# Patient Record
Sex: MALE | Race: WHITE | NOT HISPANIC OR LATINO | Employment: FULL TIME | URBAN - METROPOLITAN AREA
[De-identification: names, ages, dates, MRNs, and addresses within clinical notes are randomized per-mention and may not be internally consistent; named-entity substitution may affect disease eponyms.]

---

## 2017-02-23 ENCOUNTER — GENERIC CONVERSION - ENCOUNTER (OUTPATIENT)
Dept: OTHER | Facility: OTHER | Age: 37
End: 2017-02-23

## 2017-02-23 ENCOUNTER — HOSPITAL ENCOUNTER (EMERGENCY)
Facility: HOSPITAL | Age: 37
Discharge: HOME | End: 2017-02-23
Admitting: EMERGENCY MEDICINE
Payer: COMMERCIAL

## 2017-02-23 VITALS
OXYGEN SATURATION: 100 % | SYSTOLIC BLOOD PRESSURE: 126 MMHG | BODY MASS INDEX: 30.1 KG/M2 | TEMPERATURE: 98.1 F | HEART RATE: 63 BPM | WEIGHT: 215 LBS | HEIGHT: 71 IN | DIASTOLIC BLOOD PRESSURE: 81 MMHG | RESPIRATION RATE: 18 BRPM

## 2017-02-23 DIAGNOSIS — B34.9 VIRAL ILLNESS: Primary | ICD-10-CM

## 2017-02-23 LAB
ANION GAP BLD CALC-SCNC: 18 MMOL/L (ref 4–13)
BASOPHILS # BLD AUTO: 0.01 THOUSANDS/ΜL (ref 0–0.1)
BASOPHILS NFR BLD AUTO: 0 % (ref 0–1)
BUN BLD-MCNC: 14 MG/DL (ref 5–25)
CA-I BLD-SCNC: 1.19 MMOL/L (ref 1.12–1.32)
CHLORIDE BLD-SCNC: 97 MMOL/L (ref 100–108)
CREAT BLD-MCNC: 0.9 MG/DL (ref 0.6–1.3)
EOSINOPHIL # BLD AUTO: 0.02 THOUSAND/ΜL (ref 0–0.61)
EOSINOPHIL NFR BLD AUTO: 0 % (ref 0–6)
ERYTHROCYTE [DISTWIDTH] IN BLOOD BY AUTOMATED COUNT: 12.1 % (ref 11.6–15.1)
GFR SERPL CREATININE-BSD FRML MDRD: >60 ML/MIN/1.73SQ M
GLUCOSE SERPL-MCNC: 102 MG/DL (ref 65–140)
HCT VFR BLD AUTO: 42.4 % (ref 36.5–49.3)
HCT VFR BLD CALC: 42 % (ref 36.5–49.3)
HGB BLD-MCNC: 14.6 G/DL (ref 12–17)
HGB BLDA-MCNC: 14.3 G/DL (ref 12–17)
LYMPHOCYTES # BLD AUTO: 1.34 THOUSANDS/ΜL (ref 0.6–4.47)
LYMPHOCYTES NFR BLD AUTO: 22 % (ref 14–44)
MCH RBC QN AUTO: 30.9 PG (ref 26.8–34.3)
MCHC RBC AUTO-ENTMCNC: 34.4 G/DL (ref 31.4–37.4)
MCV RBC AUTO: 90 FL (ref 82–98)
MONOCYTES # BLD AUTO: 0.67 THOUSAND/ΜL (ref 0.17–1.22)
MONOCYTES NFR BLD AUTO: 11 % (ref 4–12)
NEUTROPHILS # BLD AUTO: 4.15 THOUSANDS/ΜL (ref 1.85–7.62)
NEUTS SEG NFR BLD AUTO: 67 % (ref 43–75)
PCO2 BLD: 27 MMOL/L (ref 21–32)
PLATELET # BLD AUTO: 160 THOUSANDS/UL (ref 149–390)
PMV BLD AUTO: 9.8 FL (ref 8.9–12.7)
POTASSIUM BLD-SCNC: 4.5 MMOL/L (ref 3.5–5.3)
RBC # BLD AUTO: 4.73 MILLION/UL (ref 3.88–5.62)
SODIUM BLD-SCNC: 137 MMOL/L (ref 136–145)
SPECIMEN SOURCE: ABNORMAL
WBC # BLD AUTO: 6.19 THOUSAND/UL (ref 4.31–10.16)

## 2017-02-23 PROCEDURE — 80047 BASIC METABLC PNL IONIZED CA: CPT

## 2017-02-23 PROCEDURE — 36415 COLL VENOUS BLD VENIPUNCTURE: CPT | Performed by: PHYSICIAN ASSISTANT

## 2017-02-23 PROCEDURE — 85025 COMPLETE CBC W/AUTO DIFF WBC: CPT | Performed by: PHYSICIAN ASSISTANT

## 2017-02-23 PROCEDURE — 99284 EMERGENCY DEPT VISIT MOD MDM: CPT

## 2017-02-23 PROCEDURE — 85014 HEMATOCRIT: CPT

## 2017-02-25 ENCOUNTER — APPOINTMENT (EMERGENCY)
Dept: RADIOLOGY | Facility: HOSPITAL | Age: 37
End: 2017-02-25
Payer: COMMERCIAL

## 2017-02-25 ENCOUNTER — HOSPITAL ENCOUNTER (EMERGENCY)
Facility: HOSPITAL | Age: 37
Discharge: HOME | End: 2017-02-25
Attending: EMERGENCY MEDICINE | Admitting: EMERGENCY MEDICINE
Payer: COMMERCIAL

## 2017-02-25 VITALS
BODY MASS INDEX: 29.29 KG/M2 | HEART RATE: 72 BPM | TEMPERATURE: 98.3 F | RESPIRATION RATE: 18 BRPM | WEIGHT: 210 LBS | OXYGEN SATURATION: 97 % | DIASTOLIC BLOOD PRESSURE: 80 MMHG | SYSTOLIC BLOOD PRESSURE: 133 MMHG

## 2017-02-25 DIAGNOSIS — R33.9 URINARY RETENTION: Primary | ICD-10-CM

## 2017-02-25 LAB
ANION GAP SERPL CALCULATED.3IONS-SCNC: 7 MMOL/L (ref 4–13)
BASOPHILS # BLD AUTO: 0.02 THOUSANDS/ΜL (ref 0–0.1)
BASOPHILS NFR BLD AUTO: 0 % (ref 0–1)
BILIRUB UR QL STRIP: NEGATIVE
BUN SERPL-MCNC: 16 MG/DL (ref 5–25)
CALCIUM SERPL-MCNC: 9 MG/DL (ref 8.3–10.1)
CHLORIDE SERPL-SCNC: 106 MMOL/L (ref 100–108)
CLARITY UR: CLEAR
CO2 SERPL-SCNC: 31 MMOL/L (ref 21–32)
COLOR UR: YELLOW
CREAT SERPL-MCNC: 0.87 MG/DL (ref 0.6–1.3)
EOSINOPHIL # BLD AUTO: 0.03 THOUSAND/ΜL (ref 0–0.61)
EOSINOPHIL NFR BLD AUTO: 1 % (ref 0–6)
ERYTHROCYTE [DISTWIDTH] IN BLOOD BY AUTOMATED COUNT: 11.8 % (ref 11.6–15.1)
GFR SERPL CREATININE-BSD FRML MDRD: >60 ML/MIN/1.73SQ M
GLUCOSE SERPL-MCNC: 74 MG/DL (ref 65–140)
GLUCOSE UR STRIP-MCNC: NEGATIVE MG/DL
HCT VFR BLD AUTO: 38.8 % (ref 36.5–49.3)
HGB BLD-MCNC: 13.3 G/DL (ref 12–17)
HGB UR QL STRIP.AUTO: NEGATIVE
KETONES UR STRIP-MCNC: NEGATIVE MG/DL
LEUKOCYTE ESTERASE UR QL STRIP: NEGATIVE
LYMPHOCYTES # BLD AUTO: 2.89 THOUSANDS/ΜL (ref 0.6–4.47)
LYMPHOCYTES NFR BLD AUTO: 63 % (ref 14–44)
MCH RBC QN AUTO: 30.9 PG (ref 26.8–34.3)
MCHC RBC AUTO-ENTMCNC: 34.3 G/DL (ref 31.4–37.4)
MCV RBC AUTO: 90 FL (ref 82–98)
MONOCYTES # BLD AUTO: 0.37 THOUSAND/ΜL (ref 0.17–1.22)
MONOCYTES NFR BLD AUTO: 8 % (ref 4–12)
NEUTROPHILS # BLD AUTO: 1.3 THOUSANDS/ΜL (ref 1.85–7.62)
NEUTS SEG NFR BLD AUTO: 28 % (ref 43–75)
NITRITE UR QL STRIP: NEGATIVE
NRBC BLD AUTO-RTO: 0 /100 WBCS
PH UR STRIP.AUTO: 6.5 [PH] (ref 4.5–8)
PLATELET # BLD AUTO: 172 THOUSANDS/UL (ref 149–390)
PMV BLD AUTO: 10.2 FL (ref 8.9–12.7)
POTASSIUM SERPL-SCNC: 3.8 MMOL/L (ref 3.5–5.3)
PROT UR STRIP-MCNC: NEGATIVE MG/DL
RBC # BLD AUTO: 4.3 MILLION/UL (ref 3.88–5.62)
SODIUM SERPL-SCNC: 144 MMOL/L (ref 136–145)
SP GR UR STRIP.AUTO: 1.02 (ref 1–1.03)
UROBILINOGEN UR QL STRIP.AUTO: 0.2 E.U./DL
WBC # BLD AUTO: 4.62 THOUSAND/UL (ref 4.31–10.16)

## 2017-02-25 PROCEDURE — 81002 URINALYSIS NONAUTO W/O SCOPE: CPT | Performed by: EMERGENCY MEDICINE

## 2017-02-25 PROCEDURE — 80048 BASIC METABOLIC PNL TOTAL CA: CPT | Performed by: EMERGENCY MEDICINE

## 2017-02-25 PROCEDURE — 99284 EMERGENCY DEPT VISIT MOD MDM: CPT

## 2017-02-25 PROCEDURE — 96374 THER/PROPH/DIAG INJ IV PUSH: CPT

## 2017-02-25 PROCEDURE — A9585 GADOBUTROL INJECTION: HCPCS | Performed by: EMERGENCY MEDICINE

## 2017-02-25 PROCEDURE — 96361 HYDRATE IV INFUSION ADD-ON: CPT

## 2017-02-25 PROCEDURE — 81003 URINALYSIS AUTO W/O SCOPE: CPT

## 2017-02-25 PROCEDURE — 72158 MRI LUMBAR SPINE W/O & W/DYE: CPT

## 2017-02-25 PROCEDURE — 85025 COMPLETE CBC W/AUTO DIFF WBC: CPT | Performed by: EMERGENCY MEDICINE

## 2017-02-25 PROCEDURE — 36415 COLL VENOUS BLD VENIPUNCTURE: CPT | Performed by: EMERGENCY MEDICINE

## 2017-02-25 PROCEDURE — 51798 US URINE CAPACITY MEASURE: CPT

## 2017-02-25 RX ADMIN — GADOBUTROL 9 ML: 604.72 INJECTION INTRAVENOUS at 22:43

## 2017-02-25 RX ADMIN — SODIUM CHLORIDE 1000 ML: 0.9 INJECTION, SOLUTION INTRAVENOUS at 20:00

## 2017-03-02 ENCOUNTER — ALLSCRIPTS OFFICE VISIT (OUTPATIENT)
Dept: OTHER | Facility: OTHER | Age: 37
End: 2017-03-02

## 2017-03-02 ENCOUNTER — TRANSCRIBE ORDERS (OUTPATIENT)
Dept: ADMINISTRATIVE | Facility: HOSPITAL | Age: 37
End: 2017-03-02

## 2017-03-02 DIAGNOSIS — R33.9 RETENTION OF URINE, UNSPECIFIED: Primary | ICD-10-CM

## 2017-05-14 ENCOUNTER — APPOINTMENT (EMERGENCY)
Dept: RADIOLOGY | Facility: HOSPITAL | Age: 37
End: 2017-05-14
Payer: COMMERCIAL

## 2017-05-14 ENCOUNTER — HOSPITAL ENCOUNTER (EMERGENCY)
Facility: HOSPITAL | Age: 37
Discharge: HOME | End: 2017-05-15
Attending: EMERGENCY MEDICINE | Admitting: EMERGENCY MEDICINE
Payer: COMMERCIAL

## 2017-05-14 VITALS
BODY MASS INDEX: 29.4 KG/M2 | DIASTOLIC BLOOD PRESSURE: 81 MMHG | SYSTOLIC BLOOD PRESSURE: 138 MMHG | HEART RATE: 65 BPM | OXYGEN SATURATION: 97 % | WEIGHT: 210 LBS | RESPIRATION RATE: 16 BRPM | HEIGHT: 71 IN | TEMPERATURE: 99 F

## 2017-05-14 DIAGNOSIS — I95.1 ORTHOSTATIC HYPOTENSION: ICD-10-CM

## 2017-05-14 DIAGNOSIS — R55 SYNCOPE: Primary | ICD-10-CM

## 2017-05-14 DIAGNOSIS — S01.81XA FACIAL LACERATION, INITIAL ENCOUNTER: ICD-10-CM

## 2017-05-14 DIAGNOSIS — S01.01XA SCALP LACERATION, INITIAL ENCOUNTER: ICD-10-CM

## 2017-05-14 LAB
ANION GAP BLD CALC-SCNC: 18 MMOL/L (ref 4–13)
ANION GAP SERPL CALCULATED.3IONS-SCNC: 6 MMOL/L (ref 4–13)
BASOPHILS # BLD AUTO: 0.02 THOUSANDS/ΜL (ref 0–0.1)
BASOPHILS NFR BLD AUTO: 0 % (ref 0–1)
BILIRUB UR QL STRIP: NEGATIVE
BUN BLD-MCNC: 27 MG/DL (ref 5–25)
BUN SERPL-MCNC: 23 MG/DL (ref 5–25)
CA-I BLD-SCNC: 1.22 MMOL/L (ref 1.12–1.32)
CALCIUM SERPL-MCNC: 9.2 MG/DL (ref 8.3–10.1)
CHLORIDE BLD-SCNC: 99 MMOL/L (ref 100–108)
CHLORIDE SERPL-SCNC: 104 MMOL/L (ref 100–108)
CLARITY UR: CLEAR
CLARITY, POC: YELLOW
CO2 SERPL-SCNC: 29 MMOL/L (ref 21–32)
COLOR UR: YELLOW
COLOR, POC: CLEAR
CREAT BLD-MCNC: 0.9 MG/DL (ref 0.6–1.3)
CREAT SERPL-MCNC: 0.96 MG/DL (ref 0.6–1.3)
EOSINOPHIL # BLD AUTO: 0.09 THOUSAND/ΜL (ref 0–0.61)
EOSINOPHIL NFR BLD AUTO: 1 % (ref 0–6)
ERYTHROCYTE [DISTWIDTH] IN BLOOD BY AUTOMATED COUNT: 12.6 % (ref 11.6–15.1)
GFR SERPL CREATININE-BSD FRML MDRD: >60 ML/MIN/1.73SQ M
GFR SERPL CREATININE-BSD FRML MDRD: >60 ML/MIN/1.73SQ M
GLUCOSE SERPL-MCNC: 134 MG/DL (ref 65–140)
GLUCOSE SERPL-MCNC: 135 MG/DL (ref 65–140)
GLUCOSE UR STRIP-MCNC: NEGATIVE MG/DL
HCT VFR BLD AUTO: 43.3 % (ref 36.5–49.3)
HCT VFR BLD CALC: 45 % (ref 36.5–49.3)
HGB BLD-MCNC: 15 G/DL (ref 12–17)
HGB BLDA-MCNC: 15.3 G/DL (ref 12–17)
HGB UR QL STRIP.AUTO: NEGATIVE
KETONES UR STRIP-MCNC: NEGATIVE MG/DL
LEUKOCYTE ESTERASE UR QL STRIP: NEGATIVE
LYMPHOCYTES # BLD AUTO: 3.29 THOUSANDS/ΜL (ref 0.6–4.47)
LYMPHOCYTES NFR BLD AUTO: 46 % (ref 14–44)
MCH RBC QN AUTO: 31.6 PG (ref 26.8–34.3)
MCHC RBC AUTO-ENTMCNC: 34.6 G/DL (ref 31.4–37.4)
MCV RBC AUTO: 91 FL (ref 82–98)
MONOCYTES # BLD AUTO: 0.39 THOUSAND/ΜL (ref 0.17–1.22)
MONOCYTES NFR BLD AUTO: 5 % (ref 4–12)
NEUTROPHILS # BLD AUTO: 3.36 THOUSANDS/ΜL (ref 1.85–7.62)
NEUTS SEG NFR BLD AUTO: 48 % (ref 43–75)
NITRITE UR QL STRIP: NEGATIVE
NRBC BLD AUTO-RTO: 0 /100 WBCS
PCO2 BLD: 29 MMOL/L (ref 21–32)
PH UR STRIP.AUTO: 6 [PH] (ref 4.5–8)
PLATELET # BLD AUTO: 181 THOUSANDS/UL (ref 149–390)
PMV BLD AUTO: 11 FL (ref 8.9–12.7)
POTASSIUM BLD-SCNC: 3.5 MMOL/L (ref 3.5–5.3)
POTASSIUM SERPL-SCNC: 3.7 MMOL/L (ref 3.5–5.3)
PROT UR STRIP-MCNC: NEGATIVE MG/DL
RBC # BLD AUTO: 4.75 MILLION/UL (ref 3.88–5.62)
SODIUM BLD-SCNC: 141 MMOL/L (ref 136–145)
SODIUM SERPL-SCNC: 139 MMOL/L (ref 136–145)
SP GR UR STRIP.AUTO: 1.02 (ref 1–1.03)
SPECIMEN SOURCE: ABNORMAL
UROBILINOGEN UR QL STRIP.AUTO: 0.2 E.U./DL
WBC # BLD AUTO: 7.16 THOUSAND/UL (ref 4.31–10.16)

## 2017-05-14 PROCEDURE — 96360 HYDRATION IV INFUSION INIT: CPT

## 2017-05-14 PROCEDURE — 85014 HEMATOCRIT: CPT

## 2017-05-14 PROCEDURE — 93005 ELECTROCARDIOGRAM TRACING: CPT | Performed by: EMERGENCY MEDICINE

## 2017-05-14 PROCEDURE — 96361 HYDRATE IV INFUSION ADD-ON: CPT

## 2017-05-14 PROCEDURE — 80047 BASIC METABLC PNL IONIZED CA: CPT

## 2017-05-14 PROCEDURE — 85025 COMPLETE CBC W/AUTO DIFF WBC: CPT | Performed by: EMERGENCY MEDICINE

## 2017-05-14 PROCEDURE — 80048 BASIC METABOLIC PNL TOTAL CA: CPT

## 2017-05-14 PROCEDURE — 81002 URINALYSIS NONAUTO W/O SCOPE: CPT | Performed by: EMERGENCY MEDICINE

## 2017-05-14 PROCEDURE — 36415 COLL VENOUS BLD VENIPUNCTURE: CPT

## 2017-05-14 PROCEDURE — 70450 CT HEAD/BRAIN W/O DYE: CPT

## 2017-05-14 PROCEDURE — 81003 URINALYSIS AUTO W/O SCOPE: CPT

## 2017-05-14 PROCEDURE — 72040 X-RAY EXAM NECK SPINE 2-3 VW: CPT

## 2017-05-14 RX ORDER — LIDOCAINE HYDROCHLORIDE AND EPINEPHRINE 10; 10 MG/ML; UG/ML
20 INJECTION, SOLUTION INFILTRATION; PERINEURAL ONCE
Status: COMPLETED | OUTPATIENT
Start: 2017-05-14 | End: 2017-05-14

## 2017-05-14 RX ADMIN — LIDOCAINE HYDROCHLORIDE,EPINEPHRINE BITARTRATE 20 ML: 10; .01 INJECTION, SOLUTION INFILTRATION; PERINEURAL at 22:29

## 2017-05-14 RX ADMIN — SODIUM CHLORIDE 1000 ML: 0.9 INJECTION, SOLUTION INTRAVENOUS at 22:31

## 2017-05-15 LAB
ATRIAL RATE: 68 BPM
P AXIS: 56 DEGREES
PR INTERVAL: 148 MS
QRS AXIS: 48 DEGREES
QRSD INTERVAL: 92 MS
QT INTERVAL: 372 MS
QTC INTERVAL: 395 MS
T WAVE AXIS: 70 DEGREES
VENTRICULAR RATE: 68 BPM

## 2017-05-15 PROCEDURE — 99285 EMERGENCY DEPT VISIT HI MDM: CPT

## 2017-05-15 RX ORDER — IBUPROFEN 600 MG/1
600 TABLET ORAL EVERY 6 HOURS PRN
Qty: 40 TABLET | Refills: 0 | Status: SHIPPED | OUTPATIENT
Start: 2017-05-15 | End: 2018-04-13 | Stop reason: ALTCHOICE

## 2017-05-15 RX ORDER — METHOCARBAMOL 500 MG/1
500 TABLET, FILM COATED ORAL 3 TIMES DAILY
Qty: 15 TABLET | Refills: 0 | Status: SHIPPED | OUTPATIENT
Start: 2017-05-15 | End: 2018-04-13 | Stop reason: ALTCHOICE

## 2017-05-15 RX ORDER — METHOCARBAMOL 500 MG/1
500 TABLET, FILM COATED ORAL 3 TIMES DAILY
Qty: 15 TABLET | Refills: 0 | Status: SHIPPED | OUTPATIENT
Start: 2017-05-15 | End: 2017-05-15

## 2017-05-15 RX ORDER — IBUPROFEN 600 MG/1
600 TABLET ORAL EVERY 6 HOURS PRN
Qty: 40 TABLET | Refills: 0 | Status: SHIPPED | OUTPATIENT
Start: 2017-05-15 | End: 2017-05-15

## 2017-11-03 ENCOUNTER — GENERIC CONVERSION - ENCOUNTER (OUTPATIENT)
Dept: OTHER | Facility: OTHER | Age: 37
End: 2017-11-03

## 2018-01-13 NOTE — PROGRESS NOTES
Patient called at 5:55PM on 2/23 to neurosurgery answering service  CC of bladder distension, worsening over past 2 weeks  Patient visited [facility] ED today with flu like symptoms x 5 days  Symptoms started  on Sunday with headache, myalgias, productive cough, green phlegm production, and intermittent fevers (Tmax = 101F)  Patient states that he self diagnosed himself with the flu as his wife and children had similar complaints over the past week  He has  been taking 400 mg ibuprofen BID for myalgias and headaches  He said that the ibuprofen has been controlling his pain and fevers  He notes that he has been drinking at least 1/2 gallon of water daily  His appetite and activity level have been decreased  The patient states that all of his symptoms have improved drastically  He presented today because of concerns of being dehydrated  He was driving to work this morning when he felt a little "off " He had two episodes of diarrhea last night and has not  had a BM since then (normally, he has two formed BMs daily)  He was urinating frequently at that time but felt that his mouth is dry and he has a thirsty feeling in his mouth  He denies any ear pain, eye pain/redness, nasal congestion, nausea, abdominal  pain, vomiting, SOB, chest palpitations, or rashes  Patient's renal fxn was WNL  He was d/c home and told to hydrate appropriately  Since home he has been drinking large quantities of water  Feels as though he has decreased sensation to urinate, becomes  distended (though not uncomfortable) but is able to urinate when he relaxes  Feels he empties his bladder 25%, though going frequently  Since laying flat over the last 5 days he notes this is progressive, though worse today  Hx of L5-S1 "urgent discectomy"  in 2014 with Dr Dean Lucas  Had a similar sensation of voiding difficulty at that time, though then was accompanied with radiculopathy and back pain   Today without complaint of back pain, radiculopathy, saddle anesthesia, urinary incontinence, flank pain  Hx of Vasectomy in 2015, last seen by urology 2 years ago  With dx PVR of 100cc, no intervention  Also with congenital "UPJ", no issues since  Discussed with patient that feelings of retention not likely an urgent neurosurgical matter if he continues  to void 25% frequently, no extreme discomfort of distension, no back pain, radicular pain, LE weakness, or saddle anesthesia  Recommended calling back if any of those sx occur  Also, recommended calling urology on call service for their input  Not taking  any new medications, only Ibuprofen 400-800mg daily, unlikely to be etiology         Electronically signed Anitra Abel   Feb 23 2017  6:20PM EST Author

## 2018-01-14 VITALS
SYSTOLIC BLOOD PRESSURE: 104 MMHG | BODY MASS INDEX: 30.57 KG/M2 | HEIGHT: 71 IN | DIASTOLIC BLOOD PRESSURE: 72 MMHG | HEART RATE: 64 BPM | WEIGHT: 218.38 LBS

## 2018-02-02 ENCOUNTER — OFFICE VISIT (OUTPATIENT)
Dept: NEUROLOGY | Facility: CLINIC | Age: 38
End: 2018-02-02
Payer: COMMERCIAL

## 2018-02-02 VITALS
WEIGHT: 215 LBS | BODY MASS INDEX: 29.99 KG/M2 | SYSTOLIC BLOOD PRESSURE: 144 MMHG | HEART RATE: 70 BPM | DIASTOLIC BLOOD PRESSURE: 68 MMHG

## 2018-02-02 DIAGNOSIS — R79.89 LOW VITAMIN D LEVEL: ICD-10-CM

## 2018-02-02 DIAGNOSIS — M54.2 CERVICALGIA: ICD-10-CM

## 2018-02-02 DIAGNOSIS — S06.0X0D CONCUSSION WITHOUT LOSS OF CONSCIOUSNESS, SUBSEQUENT ENCOUNTER: ICD-10-CM

## 2018-02-02 DIAGNOSIS — G43.109 MIGRAINE WITH AURA AND WITHOUT STATUS MIGRAINOSUS, NOT INTRACTABLE: Primary | ICD-10-CM

## 2018-02-02 PROBLEM — S06.0X9A CONCUSSION: Status: ACTIVE | Noted: 2018-02-02

## 2018-02-02 PROBLEM — S06.0XAA CONCUSSION: Status: ACTIVE | Noted: 2018-02-02

## 2018-02-02 PROCEDURE — 99244 OFF/OP CNSLTJ NEW/EST MOD 40: CPT | Performed by: PSYCHIATRY & NEUROLOGY

## 2018-02-02 NOTE — PATIENT INSTRUCTIONS
Preventive:   1  Magnesium Oxide 400-500 mg a day  If any diarrhea or upset stomach, decrease dose as tolerated  2  B2 400 mg a day  May take once a day or take 200 mg twice a day  We did discuss that this supplement will change the color of the urine to fluorescent yellow no matter how hydrated  Need to keep a headache diary to understand how many headaches he is really getting  Abortive:   - Advil for now and less then 3 times a week to prevent from developing rebound headaches  The patient was counseled regarding:  Discussed side effects of all medications prescribed today to the patient in detail  Patient education was completed today and we also discussed precautions for rebound headaches  When patient has a moderate to severe headache, they should seek rest, initiate relaxation and apply cold compresses to the head  1  Maintain regular sleep schedule  Adults need at least 7-8 hours of uninterrupted a night  2  Limit over the counter medications such as Tylenol, Ibuprofen, Aleve, Excedrin  (No more than 3 times a week)  3  Maintain headache diary  We discussed an MARIANNE for a smart phone is "Migraine eDiary"  4  Limit caffeine to 1-2 cups a day or less  5  Avoid dietary trigger  (list given to the patient)  6  Patient is to have regular frequent meals to prevent headache onset  7   Please drink at least 64 ounces of water a day to help remain hydrated

## 2018-02-15 ENCOUNTER — EVALUATION (OUTPATIENT)
Dept: PHYSICAL THERAPY | Facility: REHABILITATION | Age: 38
End: 2018-02-15
Payer: COMMERCIAL

## 2018-02-15 DIAGNOSIS — M54.2 CERVICALGIA: Primary | ICD-10-CM

## 2018-02-15 PROCEDURE — G8984 CARRY CURRENT STATUS: HCPCS | Performed by: PHYSICAL THERAPIST

## 2018-02-15 PROCEDURE — 97161 PT EVAL LOW COMPLEX 20 MIN: CPT | Performed by: PHYSICAL THERAPIST

## 2018-02-15 PROCEDURE — G8985 CARRY GOAL STATUS: HCPCS | Performed by: PHYSICAL THERAPIST

## 2018-02-15 PROCEDURE — 97140 MANUAL THERAPY 1/> REGIONS: CPT | Performed by: PHYSICAL THERAPIST

## 2018-02-15 NOTE — PROGRESS NOTES
PT Evaluation     Today's date: 2/15/2018  Patient name: Gloria Nguyễn  : 1980  MRN: [id number]  Referring provider: Carmen Cohen MD  Dx:   Encounter Diagnosis   Name Primary?  Cervicalgia Yes                  Assessment    Assessment details: Gloria Nguyễn is a 40 y o  male referred to outpt PT with dx of  Pt presents with left cervical rotation, positive STT into UT/LS/MT, TTP in left cervical musculature, and positive PAVIM restriction  Pt funct is limited with decrease in tolerance to turning head while driving or sleeping in prone, but notes ability to perform most funct tasks despite sx's  Pt would benefit from skilled PT to address these limitations and max funct  Goals  Impairment 1  Increase ROM by 25% x4 weeks  2  PAVIM wnl's x4 weeks  Funct 1  Return to driving or turning head to left without pain x4 weeks  Plan  Patient would benefit from: skilled PT  Planned therapy interventions: manual therapy, neuromuscular re-education, postural training and therapeutic exercise  Frequency: 1x week  Duration in weeks: 6        Subjective Evaluation    History of Present Illness  Mechanism of injury: Pt notes May 2017 he was in the shower at home and passed out, falling out of the shower and landed on right side of head  Pt notes initially was seen for stitches, but did not f/u immediately after incident  Pt notes after months of c/o neck pain and HA's at times  Pt is scheduled to MRI of brain tomorrow per neurologist visit on 18  Pt notes HA's cause "aura" but denies any changes in vision and is able to be controlled with use of medication  Pt cont to work without restrictions but notes having cervical stiffness  Pt notes overall he is able to perform all activities and funct needed throughout the day, however does c/o stiffness and soreness primarily to left  Pt denies any sleep disturbances      Pain  Current pain ratin  At best pain ratin  At worst pain rating: 7  Relieving factors: change in position and medications    Patient Goals  Patient goals for therapy: increased motion          Objective     Active Range of Motion   Cervical/Thoracic Spine   Cervical    Flexion: WFL  Extension: WFL  Left lateral flexion: WFL and with pain  Right lateral flexion: WFL  Left rotation: 45 degrees with pain  Right rotation: [facility]    Additional Active Range of Motion Details  Pt has normal cervical ROM in all directions except left rotation  Pt has pain with left rotation and left SB at TP of C6-7  Pt has mod FHRS posture bilat  Pt has diminished lower cervical PAVIM 2/3 and upper thoracic 1/3 without pain, however stiff  Pt has mod STT and tenderness present into left LS UT MT  Strength/Myotome Testing   Cervical Spine     Left   Normal strength    Right etr  Normal strength      Precautions: Lumbar surgery       Specialty Daily Treatment Diary     Manual  2/15/18       IASTM Left UT/LS 5 mins       CPA/UPA C6-T4 5 mins                                   Exercise Diary         UT stretch        Chin tucks        LS stretch        Rhomboid stretch        UBE bwds                                                                                                                                    Modalities                                        Flowsheet Rows    Flowsheet Row Most Recent Value   PT/OT G-Codes   Current Score  78   Projected Score  80   FOTO information reviewed  Yes   Assessment Type  Evaluation   G code set  Carrying, Moving & Handling Objects   Carrying, Moving and Handling Objects Current Status ()  CJ   Carrying, Moving and Handling Objects Goal Status ()  Jair Delacruz

## 2018-02-16 ENCOUNTER — HOSPITAL ENCOUNTER (OUTPATIENT)
Dept: MRI IMAGING | Facility: HOSPITAL | Age: 38
Discharge: HOME | End: 2018-02-16
Attending: PSYCHIATRY & NEUROLOGY
Payer: COMMERCIAL

## 2018-02-16 ENCOUNTER — TRANSCRIBE ORDERS (OUTPATIENT)
Dept: ADMINISTRATIVE | Facility: HOSPITAL | Age: 38
End: 2018-02-16

## 2018-02-16 DIAGNOSIS — G43.109 MIGRAINE WITH AURA AND WITHOUT STATUS MIGRAINOSUS, NOT INTRACTABLE: ICD-10-CM

## 2018-02-16 PROCEDURE — 70553 MRI BRAIN STEM W/O & W/DYE: CPT

## 2018-02-16 PROCEDURE — A9585 GADOBUTROL INJECTION: HCPCS | Performed by: PSYCHIATRY & NEUROLOGY

## 2018-02-16 RX ADMIN — GADOBUTROL 9 ML: 604.72 INJECTION INTRAVENOUS at 16:50

## 2018-02-22 ENCOUNTER — OFFICE VISIT (OUTPATIENT)
Dept: PHYSICAL THERAPY | Facility: REHABILITATION | Age: 38
End: 2018-02-22
Payer: COMMERCIAL

## 2018-02-22 DIAGNOSIS — M54.2 CERVICALGIA: Primary | ICD-10-CM

## 2018-02-22 PROCEDURE — 97140 MANUAL THERAPY 1/> REGIONS: CPT

## 2018-02-22 PROCEDURE — 97110 THERAPEUTIC EXERCISES: CPT

## 2018-02-22 NOTE — PROGRESS NOTES
Daily Note     Today's date: 2018  Patient name: Lennox Kurtz  : 1980  MRN: [id number]  Referring provider: Laine Burgos MD  Dx:   Encounter Diagnosis     ICD-10-CM    1  Cervicalgia M54 2                   Subjective: Pt reports "some improvement" in sx's after IE   Pt notes tightness with cervical rotation L > R as primary limitation, denies pain currently  Objective: See treatment diary below  Precautions: Lumbar surgery       Specialty Daily Treatment Diary      Manual  2/15/18  2/22/18         IASTM Left UT/LS 5 mins  10 mins         CPA/UPA C6-T4 5 mins  NP                                                         Exercise Diary              UT stretch    15"x5         Chin tucks    5"x10         LS stretch    15"x5         Rhomboid stretch    15"x5         UBE bwds    5 mins                                                                                                                                                                                                                                 Modalities                                                                 Assessment: Tolerated treatment well  Has mod restrictions present into L Ut/levator, f/b addition of stretches with good response  Pt requires min cues to facilitate proper technique and denies any increase in pain  Patient was provided with HEP, verbalizes understanding and would benefit from continued PT  Plan: Continue per plan of care

## 2018-04-13 ENCOUNTER — HOSPITAL ENCOUNTER (EMERGENCY)
Facility: HOSPITAL | Age: 38
Discharge: HOME | End: 2018-04-14
Attending: EMERGENCY MEDICINE
Payer: COMMERCIAL

## 2018-04-13 DIAGNOSIS — K52.9 ACUTE GASTROENTERITIS: Primary | ICD-10-CM

## 2018-04-13 RX ORDER — IBUPROFEN 600 MG/1
TABLET ORAL EVERY 6 HOURS PRN
COMMUNITY

## 2018-04-13 RX ORDER — ONDANSETRON 2 MG/ML
4 INJECTION INTRAMUSCULAR; INTRAVENOUS ONCE
Status: DISCONTINUED | OUTPATIENT
Start: 2018-04-14 | End: 2018-04-14 | Stop reason: HOSPADM

## 2018-04-13 RX ORDER — KETOROLAC TROMETHAMINE 30 MG/ML
15 INJECTION, SOLUTION INTRAMUSCULAR; INTRAVENOUS ONCE
Status: DISCONTINUED | OUTPATIENT
Start: 2018-04-14 | End: 2018-04-14 | Stop reason: HOSPADM

## 2018-04-14 VITALS
SYSTOLIC BLOOD PRESSURE: 132 MMHG | RESPIRATION RATE: 18 BRPM | HEART RATE: 89 BPM | DIASTOLIC BLOOD PRESSURE: 75 MMHG | OXYGEN SATURATION: 96 % | WEIGHT: 210 LBS | TEMPERATURE: 98 F | BODY MASS INDEX: 29.29 KG/M2

## 2018-04-14 PROCEDURE — 96360 HYDRATION IV INFUSION INIT: CPT

## 2018-04-14 PROCEDURE — 99283 EMERGENCY DEPT VISIT LOW MDM: CPT

## 2018-04-14 RX ORDER — ONDANSETRON 4 MG/1
4 TABLET, ORALLY DISINTEGRATING ORAL EVERY 8 HOURS PRN
Qty: 10 TABLET | Refills: 0 | Status: SHIPPED | OUTPATIENT
Start: 2018-04-14 | End: 2018-05-04

## 2018-04-14 RX ORDER — DICYCLOMINE HCL 20 MG
20 TABLET ORAL EVERY 6 HOURS PRN
Qty: 10 TABLET | Refills: 0 | Status: SHIPPED | OUTPATIENT
Start: 2018-04-14 | End: 2018-05-04

## 2018-04-14 RX ADMIN — SODIUM CHLORIDE 1000 ML: 0.9 INJECTION, SOLUTION INTRAVENOUS at 00:07

## 2018-04-14 RX ADMIN — SODIUM CHLORIDE 1000 ML: 0.9 INJECTION, SOLUTION INTRAVENOUS at 00:47

## 2018-04-14 NOTE — DISCHARGE INSTRUCTIONS
Gastroenteritis   WHAT YOU NEED TO KNOW:   Gastroenteritis, or stomach flu, is an infection of the stomach and intestines  DISCHARGE INSTRUCTIONS:   Call 911 for any of the following:   · You have trouble breathing or a very fast pulse  Return to the emergency department if:   · You see blood in your diarrhea  · You cannot stop vomiting  · You have not urinated for 12 hours  · You feel like you are going to faint  Contact your healthcare provider if:   · You have a fever  · You continue to vomit or have diarrhea, even after treatment  · You see worms in your diarrhea  · Your mouth or eyes are dry  You are not urinating as much or as often  · You have questions or concerns about your condition or care  Medicines:   · Medicines  may be given to stop vomiting or diarrhea, decrease abdominal cramps, or treat an infection  · Take your medicine as directed  Contact your healthcare provider if you think your medicine is not helping or if you have side effects  Tell him or her if you are allergic to any medicine  Keep a list of the medicines, vitamins, and herbs you take  Include the amounts, and when and why you take them  Bring the list or the pill bottles to follow-up visits  Carry your medicine list with you in case of an emergency  Manage your symptoms:   · Drink liquids as directed  Ask your healthcare provider how much liquid to drink each day, and which liquids are best for you  You may also need to drink an oral rehydration solution (ORS)  An ORS has the right amounts of sugar, salt, and minerals in water to replace body fluids  · Eat bland foods  When you feel hungry, begin eating soft, bland foods  Examples are bananas, clear soup, potatoes, and applesauce  Do not have dairy products, alcohol, sugary drinks, or drinks with caffeine until you feel better  · Rest as much as possible  Slowly start to do more each day when you begin to feel better    Prevent the spread of gastroenteritis:  Gastroenteritis can spread easily  Keep yourself, your family, and your surroundings clean to help prevent the spread of gastroenteritis:  · Wash your hands often  Use soap and water  Wash your hands after you use the bathroom, change a child's diapers, or sneeze  Wash your hands before you prepare or eat food  · Clean surfaces and do laundry often  Wash your clothes and towels separately from the rest of the laundry  Clean surfaces in your home with antibacterial  or bleach  · Clean food thoroughly and cook safely  Wash raw vegetables before you cook  Cook meat, fish, and eggs fully  Do not use the same dishes for raw meat as you do for other foods  Refrigerate any leftover food immediately  · Be aware when you camp or travel  Drink only clean water  Do not drink from rivers or lakes unless you purify or boil the water first  When you travel, drink bottled water and do not add ice  Do not eat fruit that has not been peeled  Do not eat raw fish or meat that is not fully cooked  Follow up with your healthcare provider as directed:  Write down your questions so you remember to ask them during your visits  © 2017 [employer] Information is for End User's use only and may not be sold, redistributed or otherwise used for commercial purposes  All illustrations and images included in CareNotes® are the copyrighted property of [employer]  or Jacoby Bowman  The above information is an  only  It is not intended as medical advice for individual conditions or treatments  Talk to your doctor, nurse or pharmacist before following any medical regimen to see if it is safe and effective for you  Abdominal Pain   WHAT YOU NEED TO KNOW:   Abdominal pain can be dull, achy, or sharp  You may have pain in one area of your abdomen, or in your entire abdomen   Your pain may be caused by a condition such as constipation, food sensitivity or poisoning, infection, or a blockage  Abdominal pain can also be from a hernia, appendicitis, or an ulcer  Liver, gallbladder, or kidney conditions can also cause abdominal pain  The cause of your abdominal pain may be unknown  DISCHARGE INSTRUCTIONS:   Return to the emergency department if:   · You have new chest pain or shortness of breath  · You have pulsing pain in your upper abdomen or lower back that suddenly becomes constant  · Your pain is in the right lower abdominal area and worsens with movement  · You have a fever over 100 4°F (38°C) or shaking chills  · You are vomiting and cannot keep food or liquids down  · Your pain does not improve or gets worse over the next 8 to 12 hours  · You see blood in your vomit or bowel movements, or they look black and tarry  · Your skin or the whites of your eyes turn yellow  · You are a woman and have a large amount of vaginal bleeding that is not your monthly period  Contact your healthcare provider if:   · You have pain in your lower back  · You are a man and have pain in your testicles  · You have pain when you urinate  · You have questions or concerns about your condition or care  Follow up with your healthcare provider within 24 hours or as directed:  Write down your questions so you remember to ask them during your visits  Medicines:   · Medicines  may be given to calm your stomach and prevent vomiting or to decrease pain  Ask how to take pain medicine safely  · Take your medicine as directed  Contact your healthcare provider if you think your medicine is not helping or if you have side effects  Tell him of her if you are allergic to any medicine  Keep a list of the medicines, vitamins, and herbs you take  Include the amounts, and when and why you take them  Bring the list or the pill bottles to follow-up visits  Carry your medicine list with you in case of an emergency    © 2017 [employer]  Information is for End User's use only and may not be sold, redistributed or otherwise used for commercial purposes  All illustrations and images included in CareNotes® are the copyrighted property of [employer]  or Jacoby Bowman  The above information is an  only  It is not intended as medical advice for individual conditions or treatments  Talk to your doctor, nurse or pharmacist before following any medical regimen to see if it is safe and effective for you

## 2018-04-14 NOTE — ED NOTES
Pt states he is feeling pretty well, has had no vomiting or diarrhea since arrival to ER  Pt satets his mouth feels very dry, pt given ice chips for PO challenge         Jese Choudhury RN  04/14/18 7670

## 2018-04-14 NOTE — ED PROVIDER NOTES
History  Chief Complaint   Patient presents with    Vomiting     Pt c/o n/v/d that began 7 hours PTA; Pt also c/o bloating feeling; denies urinary symptoms    Diarrhea     68-year-old male with a history of migraine headaches, chronic back pain, UPJ obstruction, presents to the emergency department with nausea, vomiting and diarrhea  Patient states he came home from work this afternoon and had a headache  He went to lay down to take a nap and woke a short while later with abdominal bloating and feeling chilled  He then had 4 episodes of diarrhea  This diarrhea was nonbloody  He slept for another 2 hours and woke up and tried to drink some water and eat a banana which he vomited  He then had 1 more episode of diarrhea  He states he was exposed to a co-worker who had similar symptoms earlier in the week  He has had no fevers  History provided by:  Patient   used: No    Diarrhea   Quality:  Watery  Severity:  Unable to specify  Onset quality:  Gradual  Number of episodes:  4  Timing:  Intermittent  Progression:  Unchanged  Relieved by:  Nothing  Worsened by:  Nothing  Ineffective treatments:  Liquids  Associated symptoms: abdominal pain, chills, headaches and vomiting    Associated symptoms: no arthralgias, no recent cough, no diaphoresis, no fever, no myalgias and no URI    Abdominal pain:     Location:  Generalized    Quality: bloating      Severity:  Unable to specify    Onset quality:  Gradual    Duration:  8 hours    Timing:  Intermittent    Progression:  Unchanged    Chronicity:  New  Risk factors: sick contacts    Risk factors: no recent antibiotic use, no suspicious food intake and no travel to endemic areas        Prior to Admission Medications   Prescriptions Last Dose Informant Patient Reported?  Taking?   ibuprofen (MOTRIN) 600 mg tablet 4/13/2018 at 1630  Yes Yes   Sig: Take by mouth every 6 (six) hours as needed for mild pain      Facility-Administered Medications: None       Past Medical History:   Diagnosis Date    Migraine     UPJ (ureteropelvic junction) obstruction     UPJ (ureteropelvic junction) obstruction     Urinary retention        Past Surgical History:   Procedure Laterality Date    BACK SURGERY      URETERAL EXPLORATION         History reviewed  No pertinent family history  I have reviewed and agree with the history as documented  Social History   Substance Use Topics    Smoking status: Never Smoker    Smokeless tobacco: Never Used    Alcohol use Yes      Comment: social        Review of Systems   Constitutional: Positive for chills  Negative for diaphoresis and fever  HENT: Negative  Eyes: Negative  Respiratory: Negative  Cardiovascular: Negative  Gastrointestinal: Positive for abdominal pain, diarrhea, nausea and vomiting  Negative for abdominal distention, blood in stool and constipation  Genitourinary: Negative  Musculoskeletal: Negative for arthralgias, myalgias and neck pain  Skin: Negative  Allergic/Immunologic: Negative  Neurological: Positive for headaches  Negative for weakness and numbness  Hematological: Negative  Psychiatric/Behavioral: Negative  All other systems reviewed and are negative  Physical Exam  ED Triage Vitals   Temperature Pulse Respirations Blood Pressure SpO2   04/13/18 2340 04/13/18 2340 04/13/18 2340 04/13/18 2340 04/13/18 2340   98 °F (36 7 °C) 88 16 120/76 96 %      Temp Source Heart Rate Source Patient Position - Orthostatic VS BP Location FiO2 (%)   04/13/18 2340 04/13/18 2340 04/14/18 0146 04/14/18 0146 --   Oral Monitor Sitting Left arm       Pain Score       04/13/18 2340       3           Orthostatic Vital Signs  Vitals:    04/13/18 2340 04/14/18 0146   BP: 120/76 132/75   Pulse: 88 89   Patient Position - Orthostatic VS:  Sitting       Physical Exam   Constitutional: He is oriented to person, place, and time  He appears well-developed and well-nourished    Non-toxic appearance  He does not have a sickly appearance  He does not appear ill  No distress  HENT:   Head: Normocephalic and atraumatic  Right Ear: External ear normal    Left Ear: External ear normal    Mouth/Throat: Oropharynx is clear and moist    Eyes: Conjunctivae are normal  Pupils are equal, round, and reactive to light  No scleral icterus  Cardiovascular: Normal rate, regular rhythm and normal heart sounds  Pulmonary/Chest: Effort normal and breath sounds normal    Abdominal: Soft  Bowel sounds are normal  He exhibits no distension and no mass  There is no tenderness  No hernia  Neurological: He is alert and oriented to person, place, and time  He has normal strength and normal reflexes  He exhibits normal muscle tone  Skin: Skin is warm and dry  No rash noted  He is not diaphoretic  No erythema  No pallor  Psychiatric: He has a normal mood and affect  Nursing note and vitals reviewed  ED Medications  Medications   ondansetron (ZOFRAN) injection 4 mg (4 mg Intravenous Not Given 4/14/18 0000)   ketorolac (TORADOL) injection 15 mg (15 mg Intravenous Not Given 4/14/18 0000)   sodium chloride 0 9 % bolus 1,000 mL (0 mL Intravenous Stopped 4/14/18 0047)   sodium chloride 0 9 % bolus 1,000 mL (0 mL Intravenous Stopped 4/14/18 0128)       Diagnostic Studies  Results Reviewed     None                 No orders to display              Procedures  Procedures       Phone Contacts  ED Phone Contact    ED Course  ED Course as of Apr 14 0154   Sat Apr 14, 2018   0150 Patient feeling better  Stable for discharge                                MDM  Number of Diagnoses or Management Options  Diagnosis management comments: 43-year-old male presents with abdominal bloating, diarrhea and 1 episode of vomiting that occurred after he got home from work today  He states a co-worker was sick with similar symptoms earlier in the week    He states he was taking sips of liquids but had an episode of vomiting and was afraid he was going to become dehydrated  On exam he is awake and alert and in no distress  He does not have any tenderness on abdominal exam   Given he had a a co-worker with similar symptoms is most likely viral gastroenteritis  Will give IV fluids, Zofran and Toradol and reassess  CritCare Time    Disposition  Final diagnoses:   Acute gastroenteritis     Time reflects when diagnosis was documented in both MDM as applicable and the Disposition within this note     Time User Action Codes Description Comment    4/14/2018  1:53 AM Yumiko COLON Add [K52 9] Acute gastroenteritis       ED Disposition     ED Disposition Condition Comment    Discharge  Georgia Hernandez discharge to home/self care  Condition at discharge: Good        Follow-up Information     Follow up With Specialties Details Why Jackson Gimenez MD Family Medicine Schedule an appointment as soon as possible for a visit in 2 days If symptoms worsen or return to the emergency department with worsening symptoms [address] [place], [place] [address] [id number]  [phone]          Patient's Medications   Discharge Prescriptions    DICYCLOMINE (BENTYL) 20 MG TABLET    Take 1 tablet (20 mg total) by mouth every 6 (six) hours as needed (pain)       Start Date: 4/14/2018 End Date: --       Order Dose: 20 mg       Quantity: 10 tablet    Refills: 0    ONDANSETRON (ZOFRAN-ODT) 4 MG DISINTEGRATING TABLET    Take 1 tablet (4 mg total) by mouth every 8 (eight) hours as needed for nausea or vomiting       Start Date: 4/14/2018 End Date: --       Order Dose: 4 mg       Quantity: 10 tablet    Refills: 0     No discharge procedures on file      ED Provider  Electronically Signed by           Jose Domínguez DO  04/14/18 5472

## 2018-04-14 NOTE — ED NOTES
Pt states he has had 4 episodes of diarrhea since 1700 tonight, and one episode of vomiting after he ate a banana a short time ago  Pt reports current nausea but denies abd pain  Pt reports feeling "slightly bloated" at present    Pt states he has been drinking water and Pedialyte and has not vomited but states "I get dehydrated very easily and I won't be able to drink enough quickly enough to catch up for what I've already lost in hydration today "        Juan Pablo Corcoran, FRANCISCO J  04/13/18 [id number]

## 2018-04-14 NOTE — ED NOTES
Pt states he does not have any nausea currently and states he has no pain or bloating, so he does not want medications at this time, and states instead that he just wants fluids right now to see if that makes him feel better         Yordan Nieves RN  04/14/18 3806

## 2018-05-03 ENCOUNTER — TRANSCRIBE ORDERS (OUTPATIENT)
Dept: LAB | Facility: CLINIC | Age: 38
End: 2018-05-03

## 2018-05-03 ENCOUNTER — APPOINTMENT (OUTPATIENT)
Dept: LAB | Facility: CLINIC | Age: 38
End: 2018-05-03
Payer: COMMERCIAL

## 2018-05-03 DIAGNOSIS — G43.109 MIGRAINE WITH AURA AND WITHOUT STATUS MIGRAINOSUS, NOT INTRACTABLE: ICD-10-CM

## 2018-05-03 DIAGNOSIS — R79.89 LOW VITAMIN D LEVEL: ICD-10-CM

## 2018-05-03 LAB
25(OH)D3 SERPL-MCNC: 17.3 NG/ML (ref 30–100)
TSH SERPL DL<=0.05 MIU/L-ACNC: 1.29 UIU/ML (ref 0.36–3.74)
VIT B12 SERPL-MCNC: 729 PG/ML (ref 100–900)

## 2018-05-03 PROCEDURE — 82306 VITAMIN D 25 HYDROXY: CPT

## 2018-05-03 PROCEDURE — 36415 COLL VENOUS BLD VENIPUNCTURE: CPT

## 2018-05-03 PROCEDURE — 84443 ASSAY THYROID STIM HORMONE: CPT

## 2018-05-03 PROCEDURE — 82607 VITAMIN B-12: CPT

## 2018-05-04 ENCOUNTER — OFFICE VISIT (OUTPATIENT)
Dept: NEUROLOGY | Facility: CLINIC | Age: 38
End: 2018-05-04
Payer: COMMERCIAL

## 2018-05-04 VITALS
SYSTOLIC BLOOD PRESSURE: 129 MMHG | HEIGHT: 71 IN | WEIGHT: 218.8 LBS | DIASTOLIC BLOOD PRESSURE: 73 MMHG | HEART RATE: 61 BPM | BODY MASS INDEX: 30.63 KG/M2

## 2018-05-04 DIAGNOSIS — M54.2 CERVICALGIA: ICD-10-CM

## 2018-05-04 DIAGNOSIS — R79.89 LOW VITAMIN D LEVEL: ICD-10-CM

## 2018-05-04 DIAGNOSIS — G43.109 MIGRAINE WITH AURA AND WITHOUT STATUS MIGRAINOSUS, NOT INTRACTABLE: Primary | ICD-10-CM

## 2018-05-04 PROCEDURE — 99214 OFFICE O/P EST MOD 30 MIN: CPT | Performed by: PSYCHIATRY & NEUROLOGY

## 2018-05-04 NOTE — PROGRESS NOTES
Patient is f/u for migraines    Patient ID: Nithya Pollock is a 45 y o  male  Assessment/Plan:    No problem-specific Assessment & Plan notes found for this encounter  {Assess/PlanSmartLinks:74497}       Subjective:    HPI    {[facility] Neurology HPI texts:25133}    The following portions of the patient's history were reviewed and updated as appropriate:   He  has a past medical history of Migraine; UPJ (ureteropelvic junction) obstruction; UPJ (ureteropelvic junction) obstruction; and Urinary retention  He   Patient Active Problem List    Diagnosis Date Noted    Migraine with aura and without status migrainosus, not intractable 02/02/2018    Concussion 02/02/2018    Cervicalgia 02/02/2018    Low vitamin D level 02/02/2018     He  has a past surgical history that includes Back surgery and Ureteral exploration  His family history is not on file  He  reports that he has never smoked  He has never used smokeless tobacco  He reports that he drinks alcohol  He reports that he does not use drugs  Current Outpatient Prescriptions   Medication Sig Dispense Refill    ibuprofen (MOTRIN) 600 mg tablet Take by mouth every 6 (six) hours as needed for mild pain       No current facility-administered medications for this visit  Current Outpatient Prescriptions on File Prior to Visit   Medication Sig    ibuprofen (MOTRIN) 600 mg tablet Take by mouth every 6 (six) hours as needed for mild pain    [DISCONTINUED] dicyclomine (BENTYL) 20 mg tablet Take 1 tablet (20 mg total) by mouth every 6 (six) hours as needed (pain)    [DISCONTINUED] ondansetron (ZOFRAN-ODT) 4 mg disintegrating tablet Take 1 tablet (4 mg total) by mouth every 8 (eight) hours as needed for nausea or vomiting     No current facility-administered medications on file prior to visit  He has No Known Allergies            Objective:    Blood pressure 129/73, pulse 61, height 5' 11" (1 803 m), weight 99 2 kg (218 lb 12 8 oz)      Physical Exam    Neurological Exam      ROS:    Review of Systems   Constitutional: Negative  HENT: Negative  Eyes: Positive for photophobia  Respiratory: Negative  Cardiovascular: Negative  Gastrointestinal: Negative  Endocrine: Negative  Genitourinary: Negative  Musculoskeletal: Negative  Skin: Negative  Allergic/Immunologic: Negative  Neurological: Positive for headaches  Hematological: Negative  Psychiatric/Behavioral: Negative

## 2018-05-04 NOTE — PROGRESS NOTES
Patient ID: Timoteo Castrejon is a 45 y o  male  Assessment/Plan:     Problem List Items Addressed This Visit        Cardiovascular and Mediastinum    Migraine with aura and without status migrainosus, not intractable - Primary       Other    Cervicalgia    Low vitamin D level    Relevant Medications    Cholecalciferol (VITAMIN D3) 61518 units TABS         He is overall doing really well with his headaches  His vitamin-D was 17  I will have him take 50,000 international units once a week for about 6 weeks and then stop  He is taking a multivitamin which should help replenish his vitamin-D after that  He is to ask his primary care to check his vitamin-D in about a year to see if he is within normal limits  Subjective:  HPI  We had the pleasure of evaluating Timoteo Castrejon in neurological follow up today  As you know,  he is a 40 y o  right handed male  He elctriciton and is here today with his wife for evaltuion of his head injruy       Head injury:   - He had a head injury in may of 2017  He fell in the shower and hit he right side of his head on the counter top and hit chin as well       Cognitive issues:   - He states he was drinking on new years jim and the next day noted that he was having a cognitive issues all day the next day  - Not recurrent event since last seen         Migraine headaches with and without aura: What medications do you take or have you taken for your headaches? PREVENTIVE: none  ABORTIVE: advil    Headache trigger: Fatigue, Stress/Tension, dehydration, lack of sleep,     Aura - tunnel vision and he can at time see through the sides but it is blurry, light headed dizziness    How often do the headaches occur -  In a week they occur much less with his glasses   He states he has been wearing his glasses for the six weeks and that has helped  What time of the day do the headaches start - 2 in the afternoon  How long do the headaches last - 2-3 hours and has to take something for it like advil  Where are they located - frontal and left frontal is hangover headaches after etoh  What is the intensity of pain - 5-7/10  Describe your usual headache - tight band, pressure, shooting and stabbing,   Associated symptoms:   · Decrease of appetite, nausea, vomiting   · Photophobia, phonophobia,   · Tinnitus, light-headed or dizzy  · prefer to be alone and in a dark room, unable to work      ROS reviewed    The following portions of the patient's history were reviewed and updated as appropriate: allergies, current medications, past family history, past medical history, past social history, past surgical history and problem list       Objective:    Blood pressure 129/73, pulse 61, height 5' 11" (1 803 m), weight 99 2 kg (218 lb 12 8 oz)  Physical Exam   Constitutional: He appears well-developed  HENT:   Head: Normocephalic  Eyes: EOM are normal  Pupils are equal, round, and reactive to light  Neck: Normal range of motion  Cardiovascular: Normal rate  Pulmonary/Chest: Effort normal    Musculoskeletal: Normal range of motion  Neurological: He has normal strength and normal reflexes  Gait and coordination normal    Skin: Skin is warm  Psychiatric: He has a normal mood and affect  His speech is normal    Nursing note and vitals reviewed  Neurological Exam    Mental Status  The patient is alert  His speech is normal  His language is fluent with no aphasia  He has normal attention span and concentration  Cranial Nerves    CN II: The patient's visual acuity and visual fields are normal   CN III, IV, VI: The patient's pupils are equally round and reactive to light and ocular movements are normal   CN V: The patient has normal facial sensation  CN VII:  The patient has symmetric facial movement  CN VIII:  The patient's hearing is normal   CN IX, X: The patient has symmetric palate movement and normal gag reflex    CN XI: The patient's shoulder shrug strength is normal   CN XII: The patient's tongue is midline without atrophy or fasciculations  Motor  The patient has normal muscle bulk throughout  His overall muscle tone is normal throughout  His strength is 5/5 throughout all four extremities  Sensory  The patient's sensation is normal in all four extremities  Reflexes  Deep tendon reflexes are 2+ and symmetric in all four extremities with downgoing toes bilaterally  Gait and Coordination  The patient has normal gait and station and normal casual, toe, heel, and tandem gait  He has normal coordination bilaterally  ROS:  Review of Systems   Constitutional: Negative  HENT: Negative  Eyes: Positive for photophobia  Respiratory: Negative  Cardiovascular: Negative  Gastrointestinal: Negative  Endocrine: Negative  Genitourinary: Negative  Musculoskeletal: Negative  Skin: Negative  Allergic/Immunologic: Negative  Neurological: Positive for headaches  Hematological: Negative  Psychiatric/Behavioral: Negative

## 2018-05-04 NOTE — PATIENT INSTRUCTIONS
He is overall doing really well with his headaches  His vitamin-D was 17  I will have him take 50,000 international units once a week for about 6 weeks and then stop  He is taking a multivitamin which should help replenish his vitamin-D after that  He is to ask his primary care to check his vitamin-D in about a year to see if he is within normal limits

## 2018-06-12 DIAGNOSIS — R79.89 LOW VITAMIN D LEVEL: ICD-10-CM

## 2018-06-12 RX ORDER — CHOLECALCIFEROL (VITAMIN D3) 1250 MCG
CAPSULE ORAL
Qty: 6 CAPSULE | Refills: 0 | Status: SHIPPED | OUTPATIENT
Start: 2018-06-12

## 2018-07-26 ENCOUNTER — TELEPHONE (OUTPATIENT)
Dept: NEUROLOGY | Facility: CLINIC | Age: 38
End: 2018-07-26

## 2018-07-26 NOTE — TELEPHONE ENCOUNTER
Patient's wife reports dull headache started 2 weeks ago  Worsened 2 nights ago, started over his left eye and the next day felt it over his right eye  Does not feel like his typical migraine  Lasted for several hours  Usually takes ibuprofen if he catches it before it gets worse  She is unsure if he currently has a headache  Did not have a follow up scheduled, made appointment for first available in October  Called patient on number provided by his wife [phone], left message for call back  Clinical Team: Calling patient to see if he is still having a migraine before sending to provider      [phone] Arpita

## 2018-07-27 NOTE — TELEPHONE ENCOUNTER
Pt called back stated that he is feeling better yesterday and today  PCP states that he was definitely having cluster headaches  Reports that he had a major headache 2-3 weeks ago, and then he had very short episodes that had a localized pain by his left eye, then they resolved  Then he had another bad migraine on Tuesday and Wednesday, he stayed from home work on Wednesday  He had reflexology on his feet Wednesday afternoon and since then he has felt better

## 2018-07-27 NOTE — TELEPHONE ENCOUNTER
Please have patient come in to see 1 of us since things are changing  From his history he did not look like he had cluster headaches  If cluster headaches we can put him on verapamil 120 t i d  Daily, Decadron 2 mg in a m  For 5 days

## 2018-07-30 NOTE — TELEPHONE ENCOUNTER
Called and advised pt of all of the below  He verbalized clear understanding of all instructions  He wants to hold off on verapamil or decadron  He states that "my cluster headaches had subsided"       Appt clifton Montano-10/12/18 @ 2 pm

## 2019-11-29 ENCOUNTER — OFFICE VISIT (OUTPATIENT)
Dept: URGENT CARE | Facility: CLINIC | Age: 39
End: 2019-11-29
Payer: COMMERCIAL

## 2019-11-29 VITALS
HEIGHT: 71 IN | RESPIRATION RATE: 20 BRPM | HEART RATE: 75 BPM | BODY MASS INDEX: 30.46 KG/M2 | WEIGHT: 217.6 LBS | SYSTOLIC BLOOD PRESSURE: 120 MMHG | TEMPERATURE: 96.1 F | DIASTOLIC BLOOD PRESSURE: 78 MMHG | OXYGEN SATURATION: 99 %

## 2019-11-29 DIAGNOSIS — H61.21 IMPACTED CERUMEN OF RIGHT EAR: Primary | ICD-10-CM

## 2019-11-29 DIAGNOSIS — R42 DIZZINESS AND GIDDINESS: ICD-10-CM

## 2019-11-29 PROCEDURE — 99203 OFFICE O/P NEW LOW 30 MIN: CPT | Performed by: PHYSICIAN ASSISTANT

## 2019-11-29 PROCEDURE — 69209 REMOVE IMPACTED EAR WAX UNI: CPT | Performed by: PHYSICIAN ASSISTANT

## 2019-11-29 RX ORDER — MECLIZINE HYDROCHLORIDE 25 MG/1
25 TABLET ORAL 3 TIMES DAILY PRN
Qty: 30 TABLET | Refills: 0 | Status: SHIPPED | OUTPATIENT
Start: 2019-11-29

## 2019-11-29 NOTE — PATIENT INSTRUCTIONS
Cerumen Impaction   WHAT YOU NEED TO KNOW:   What is cerumen impaction? Cerumen impaction is the blockage of the outer ear canal by tightly packed cerumen (earwax)  What causes cerumen impaction? Anything that affects the normal outward flow of cerumen may cause impaction  The following factors may make it more likely for earwax to become impacted:  · Advanced age     · Conditions that produce too much cerumen, such as keratosis and other skin diseases    · Narrow or abnormally shaped ear canals    · Use of a hearing aid    · Incorrect use of cotton swabs, or using needles, hair pins, or other objects to clean the ears  What are the signs and symptoms of cerumen impaction? · Trouble hearing    · Dizziness    · Ear fullness or a feeling that something is plugging up your ear    · Itchiness or pain in the ears    · Ringing in the ears  How is cerumen impaction diagnosed? Your healthcare provider will ask about your medical history  This includes any ear problems or procedures you may have had  Your healthcare provider will carefully check your ears using a scope with a light  Your healthcare provider may look for other problems, such as bleeding, infection, or injury  Your eardrums will be checked for tears or holes  Your healthcare provider may also test your hearing  How is cerumen impaction treated? The goal of treatment is to remove the hardened wax  The type of treatment to be used may depend on your age, symptoms, or risk factors  Ask your healthcare provider which of the following treatments may be best for you:  · Ear drops:  Ear drops may be used to clear or soften the impacted earwax  This may be used alone or in combination with a procedure to take out the earwax      · Procedures:  When the impaction can be clearly seen, removal may be done using any of the following:    ¨ Irrigation:  Warm water from a syringe is used to wash the wax out of the ear canal  Irrigation may not be used on people with an eardrum tear, infection, or who have had ear surgery  ¨ Suction:  A small plastic tube that is connected to a machine is used to suck the impacted wax out of your ear  ¨ Instruments:  Your healthcare provider may use a curette (scoop-like instrument) or forceps to remove the impacted wax  What are the risks of having a cerumen impaction? · Procedures to remove the wax may cause bleeding and infection  The ear canal may be scraped and scratched or the eardrum may be injured, which may cause loss of hearing  · Untreated impacted cerumen may cause your symptoms to become worse  If it is not removed, impacted cerumen may cause an infection, irritation, loss of hearing, or further ear problems  When should I contact my healthcare provider? · You have a fever  · You have trouble hearing or hear ringing noises  · You have questions or concerns about your condition or care  When should I seek immediate care? · You feel dizzy  · You have discharge or blood coming out of your ear  · Your ear pain does not go away or gets worse  CARE AGREEMENT:   You have the right to help plan your care  Learn about your health condition and how it may be treated  Discuss treatment options with your caregivers to decide what care you want to receive  You always have the right to refuse treatment  The above information is an  only  It is not intended as medical advice for individual conditions or treatments  Talk to your doctor, nurse or pharmacist before following any medical regimen to see if it is safe and effective for you  © 2017 [employer]  Information is for End User's use only and may not be sold, redistributed or otherwise used for commercial purposes  All illustrations and images included in CareNotes® are the copyrighted property of [employer]  or [employer] Bowman

## 2019-11-29 NOTE — PROGRESS NOTES
[facility]        NAME: Rogerio Manriquez is a 44 y o  male  : 1980    MRN: [id number]  DATE: 2019  TIME: 5:43 PM    Assessment and Plan   Impacted cerumen of right ear [H61 21]  1  Impacted cerumen of right ear     2  Dizziness and giddiness  meclizine (ANTIVERT) 25 mg tablet     Canal irrigated with removal of cerumen  TM appears normal without rupture  Patient's symptoms have improved  Recommend Claritin due to some fluid behind the TMs bilaterally  Patient may also take meclizine as needed  Patient Instructions     Patient Instructions     Cerumen Impaction   WHAT YOU NEED TO KNOW:   What is cerumen impaction? Cerumen impaction is the blockage of the outer ear canal by tightly packed cerumen (earwax)  What causes cerumen impaction? Anything that affects the normal outward flow of cerumen may cause impaction  The following factors may make it more likely for earwax to become impacted:  · Advanced age     · Conditions that produce too much cerumen, such as keratosis and other skin diseases    · Narrow or abnormally shaped ear canals    · Use of a hearing aid    · Incorrect use of cotton swabs, or using needles, hair pins, or other objects to clean the ears  What are the signs and symptoms of cerumen impaction? · Trouble hearing    · Dizziness    · Ear fullness or a feeling that something is plugging up your ear    · Itchiness or pain in the ears    · Ringing in the ears  How is cerumen impaction diagnosed? Your healthcare provider will ask about your medical history  This includes any ear problems or procedures you may have had  Your healthcare provider will carefully check your ears using a scope with a light  Your healthcare provider may look for other problems, such as bleeding, infection, or injury  Your eardrums will be checked for tears or holes  Your healthcare provider may also test your hearing  How is cerumen impaction treated?   The goal of treatment is to remove the hardened wax  The type of treatment to be used may depend on your age, symptoms, or risk factors  Ask your healthcare provider which of the following treatments may be best for you:  · Ear drops:  Ear drops may be used to clear or soften the impacted earwax  This may be used alone or in combination with a procedure to take out the earwax  · Procedures:  When the impaction can be clearly seen, removal may be done using any of the following:    ¨ Irrigation:  Warm water from a syringe is used to wash the wax out of the ear canal  Irrigation may not be used on people with an eardrum tear, infection, or who have had ear surgery  ¨ Suction:  A small plastic tube that is connected to a machine is used to suck the impacted wax out of your ear  ¨ Instruments:  Your healthcare provider may use a curette (scoop-like instrument) or forceps to remove the impacted wax  What are the risks of having a cerumen impaction? · Procedures to remove the wax may cause bleeding and infection  The ear canal may be scraped and scratched or the eardrum may be injured, which may cause loss of hearing  · Untreated impacted cerumen may cause your symptoms to become worse  If it is not removed, impacted cerumen may cause an infection, irritation, loss of hearing, or further ear problems  When should I contact my healthcare provider? · You have a fever  · You have trouble hearing or hear ringing noises  · You have questions or concerns about your condition or care  When should I seek immediate care? · You feel dizzy  · You have discharge or blood coming out of your ear  · Your ear pain does not go away or gets worse  CARE AGREEMENT:   You have the right to help plan your care  Learn about your health condition and how it may be treated  Discuss treatment options with your caregivers to decide what care you want to receive  You always have the right to refuse treatment   The above information is an  only  It is not intended as medical advice for individual conditions or treatments  Talk to your doctor, nurse or pharmacist before following any medical regimen to see if it is safe and effective for you  © 2017 [employer] Information is for End User's use only and may not be sold, redistributed or otherwise used for commercial purposes  All illustrations and images included in CareNotes® are the copyrighted property of [employer]  or [employer]y Bowman  Proceed to  ER if symptoms worsen  Chief Complaint     Chief Complaint   Patient presents with    Dizziness     patient reports he was in [address] last week, got water in ears swimming, today started with vertigo along with episode right ear discomfort  History of Present Illness       Patient presents with chief complaint of 2 days of right ear pain, muffled hearing and dizziness  He states that he just got back from vacation where he did a lot of swimming in the ocean  He has some sinus congestion as well  He denies any fevers  Review of Systems   Review of Systems   Constitutional: Negative for chills and fever  HENT: Positive for congestion and ear pain  Negative for ear discharge  Eyes: Negative  Respiratory: Negative  Cardiovascular: Negative  Neurological: Positive for dizziness           Current Medications       Current Outpatient Medications:     Cholecalciferol (VITAMIN D3) 63188 units CAPS, ONE A WEEK FOR 6 SIX WEEKS, Disp: 6 capsule, Rfl: 0    ibuprofen (MOTRIN) 600 mg tablet, Take by mouth every 6 (six) hours as needed for mild pain, Disp: , Rfl:     meclizine (ANTIVERT) 25 mg tablet, Take 1 tablet (25 mg total) by mouth 3 (three) times a day as needed for dizziness, Disp: 30 tablet, Rfl: 0    Current Allergies     Allergies as of 11/29/2019    (No Known Allergies)            The following portions of the patient's history were reviewed and updated as appropriate: allergies, current medications, past family history, past medical history, past social history, past surgical history and problem list      Past Medical History:   Diagnosis Date    Migraine     UPJ (ureteropelvic junction) obstruction     UPJ (ureteropelvic junction) obstruction     Urinary retention        Past Surgical History:   Procedure Laterality Date    BACK SURGERY      URETERAL EXPLORATION         No family history on file  Medications have been verified  Objective   /78   Pulse 75   Temp (!) 96 1 °F (35 6 °C)   Resp 20   Ht 5' 11" (1 803 m)   Wt 98 7 kg (217 lb 9 6 oz)   SpO2 99%   BMI 30 35 kg/m²        Physical Exam     Physical Exam   Constitutional: He is oriented to person, place, and time  He appears well-developed  No distress  HENT:   Mouth/Throat: Oropharynx is clear and moist    Right TM obscured by cerumen impaction  Left TM : effusion, no erythema   Cardiovascular: Normal rate and regular rhythm  Pulmonary/Chest: Effort normal and breath sounds normal    Neurological: He is alert and oriented to person, place, and time  Vitals reviewed

## 2020-06-04 ENCOUNTER — OFFICE VISIT (OUTPATIENT)
Dept: URGENT CARE | Facility: CLINIC | Age: 40
End: 2020-06-04
Payer: COMMERCIAL

## 2020-06-04 VITALS
RESPIRATION RATE: 18 BRPM | TEMPERATURE: 96.5 F | DIASTOLIC BLOOD PRESSURE: 75 MMHG | BODY MASS INDEX: 30.35 KG/M2 | OXYGEN SATURATION: 100 % | HEART RATE: 61 BPM | HEIGHT: 71 IN | SYSTOLIC BLOOD PRESSURE: 129 MMHG

## 2020-06-04 DIAGNOSIS — R42 DIZZINESS AND GIDDINESS: Primary | ICD-10-CM

## 2020-06-04 PROCEDURE — S9083 URGENT CARE CENTER GLOBAL: HCPCS | Performed by: NURSE PRACTITIONER

## 2020-06-04 PROCEDURE — G0382 LEV 3 HOSP TYPE B ED VISIT: HCPCS | Performed by: NURSE PRACTITIONER

## 2020-06-04 RX ORDER — MECLIZINE HYDROCHLORIDE 25 MG/1
25 TABLET ORAL EVERY 8 HOURS PRN
Qty: 30 TABLET | Refills: 0 | Status: SHIPPED | OUTPATIENT
Start: 2020-06-04

## 2021-08-31 NOTE — PROGRESS NOTES
Patient Education        abatacept  Pronunciation:  a BAY ta sept  Brand:  Daniel  What is the most important information I should know about abatacept? Follow all directions on your medicine label and package. Tell each of your healthcare providers about all your medical conditions, allergies, and all medicines you use. What is abatacept? Abatacept is used to treat symptoms of rheumatoid arthritis, and to prevent joint damage caused by these conditions. This medicine is for adults and children at least 3years old. Abatacept is also used to treat active psoriatic arthritis in adults. Abatacept is not a cure for any autoimmune disorder and will only treat the symptoms of your condition. Abatacept may also be used for purposes not listed in this medication guide. What should I discuss with my healthcare provider before using abatacept? You should not use abatacept if you are allergic to it. Before using abatacept, tell your doctor if you have ever had tuberculosis, if anyone in your household has tuberculosis, or if you have recently traveled to an area where tuberculosis is common. Tell your doctor if you have ever had:  · a weak immune system;  · any type of infection including a skin infection or open sores;  · infections that go away and come back;  · COPD (chronic obstructive pulmonary disease);  · diabetes;  · hepatitis; or  · if you are scheduled to receive any vaccines. Using abatacept may increase your risk of developing certain types of cancer such as lymphoma (cancer of the lymph nodes). This risk may be greater in older adults. Talk to your doctor about your specific risk. Tell your doctor if you are pregnant or breastfeeding. If you are pregnant, your name may be listed on a pregnancy registry to track the effects of abatacept on the baby. Children using abatacept should be current on all childhood immunizations before starting treatment. How should I use abatacept?   Before you start Patient ID: Katlyn Davalos is a 40 y o  male  Assessment/Plan:       Problem List Items Addressed This Visit        Cardiovascular and Mediastinum    Migraine with aura and without status migrainosus, not intractable - Primary    Relevant Orders    MRI brain with and without contrast    Vitamin B12    TSH, 3rd generation       Other    Concussion    Cervicalgia    Relevant Orders    Ambulatory referral to Physical Therapy    Low vitamin D level    Relevant Orders    Vitamin D 25 hydroxy        Preventive:   1  Magnesium Oxide 400-500 mg a day  If any diarrhea or upset stomach, decrease dose as tolerated  2  B2 400 mg a day  May take once a day or take 200 mg twice a day  We did discuss that this supplement will change the color of the urine to fluorescent yellow no matter how hydrated  Need to keep a headache diary to understand how many headaches he is really getting  Abortive:   - Advil for now and less then 3 times a week to prevent from developing rebound headaches  The patient was counseled regarding:  Discussed side effects of all medications prescribed today to the patient in detail  Patient education was completed today and we also discussed precautions for rebound headaches  When patient has a moderate to severe headache, they should seek rest, initiate relaxation and apply cold compresses to the head  1  Maintain regular sleep schedule  Adults need at least 7-8 hours of uninterrupted a night  2  Limit over the counter medications such as Tylenol, Ibuprofen, Aleve, Excedrin  (No more than 3 times a week)  3  Maintain headache diary  We discussed an MARIANNE for a smart phone is "Migraine eDiary"  4  Limit caffeine to 1-2 cups a day or less  5  Avoid dietary trigger  (list given to the patient)  6  Patient is to have regular frequent meals to prevent headache onset  7   Please drink at least 64 ounces of water a day to help remain hydrated        Subjective:    HPI   We had the pleasure of evaluating Katlyn Davalos in neurological consultation today  As you know,  he is a 40 y o  right handed male  He elctriciton and is here today with his wife for evaltuion of his head injruy  Head injury:   - He had a head injury in may of 2017  He fell in the shower and hit he right side of his head on the counter top and hit chin as well  Cognitive issues:   - He states he was drinking on new years jim and the next day noted that he was having a cognitive issues all day the next day  Any family history of aneurysms - No  Family history of migraine headaches -   No            What is your current pain level - 0/10  Headaches started at what age -23  Accident or injury prior to onset of headaches - may 2017, sheet glass which hit him in 2016  Hypotension and syncope  At age 23 he had a dryer fell on him  How often do the headaches occur -  In a week they occur much less with his glasses   He states he has been wearing his glasses for the six weeks and that has helped  What time of the day do the headaches start - 2 in the afternoon  How long do the headaches last - 2-3 hours and has to take something for it like advil  Are you ever headache free - yes  Where are they located - frontal and left frontal is hangover headaches after etoh  What is the intensity of pain - 5-7/10  Any warning prior to headache and how long do they last - tunnel vision and he can at time see through the sides but it is blurry, light headed dizziness  Describe your usual headache - tight band, pressure, shooting and stabbing,   Associated symptoms:   - Decrease of appetite, nausea, vomiting   - Photophobia, phonophobia,   - Tinnitus, light-headed or dizzy  - prefer to be alone and in a dark room, unable to work  Headache are worse if the patient: cough, sneeze, bending over  Headache trigger: Fatigue, Stress/Tension, dehydration, lack of sleep,   What time of the year do headaches occur more frequently - treatment with abatacept, your doctor may perform tests to make sure you do not have tuberculosis or other infections. Abatacept is injected under the skin, or as an infusion into a vein. A healthcare provider will give your first dose and may teach you how to properly use the medication by yourself. Abatacept is injected under the skin when given to a child between 3and 10years old. Abatacept must be given slowly when injected into a vein, and the IV infusion can take at least 30 minutes to complete. This medicine is usually given every 1 to 4 weeks. Follow your doctor's instructions. Abatacept must be mixed with a liquid (diluent) before using it. When using injections by yourself, be sure you understand how to properly mix and store the medicine. Read and carefully follow any Instructions for Use provided with your medicine. Ask your doctor or pharmacist if you don't understand all instructions. Prepare an injection only when you are ready to give it. Gently swirl but do not shake the medication bottle. Do not use if the medicine looks cloudy, has changed colors, or has particles in it. Call your pharmacist for new medicine. Each vial (bottle) or prefilled syringe is for one use only. Throw it away after one use, even if there is still medicine left inside. Use a needle and syringe only once and then place them in a puncture-proof \"sharps\" container. Follow state or local laws about how to dispose of this container. Keep it out of the reach of children and pets. If you need surgery, tell the surgeon ahead of time that you are using abatacept. If you've ever had hepatitis B, using abatacept can cause this virus to become active or get worse. You may need frequent liver function tests while using this medicine and for several months after you stop. Abatacept can cause false results with certain blood glucose tests, showing high blood sugar readings.  If you have diabetes, talk to your doctor about the best way to test your blood sugar. Autoimmune disorders are often treated with a combination of different drugs. Use all medications as directed and read all medication guides you receive. Do not change your dose or dosing schedule without your doctor's advice. Store abatacept in original carton in a refrigerator. Protect from light and do not freeze. Do not use after the expiration date on the medicine label has passed. If you need to travel with your medicine, place the syringes in a cooler with ice packs. Abatacept mixed with a diluent may be stored in a refrigerator or at room temperature and must be used within 24 hours. What happens if I miss a dose? Call your doctor for instructions if you miss your abatacept dose. What happens if I overdose? Seek emergency medical attention or call the Poison Help line at 1-589.105.3138. What should I avoid while using abatacept? Do not receive a \"live\" vaccine while using abatacept, and for at least 3 months after your treatment ends. The vaccine may not work as well during this time, and may not fully protect you from disease. Live vaccines include measles, mumps, rubella (MMR), rotavirus, typhoid, yellow fever, varicella (chickenpox), zoster (shingles), and nasal flu (influenza) vaccine. Avoid being near people who are sick or have infections. Tell your doctor at once if you develop signs of infection. What are the possible side effects of abatacept? Get emergency medical help if you have signs of an allergic reaction:  hives; difficulty breathing; swelling of your face, lips, tongue, or throat. Some side effects may occur during the injection. Tell your caregiver right away if you feel dizzy, light-headed, itchy, or have a severe headache or trouble breathing within 1 hour after receiving the injection. You may get infections more easily, even serious or fatal infections.  Call your doctor right away if you have signs of infection such as:  · fever, chills, none  Have you seen someone else for headaches or pain - yes at [facility] in the [facility]  Have you had trigger point injection performed and how often - none  Have you had Botox injection performed and how often - none  Have you had epidural injections or transforaminal injections performed -none  What medications do you take or have you taken for your headaches? PREVENTIVE: none  ABORTIVE: advil  Non-Medical/Alternative Treatments used in the past for headaches: none        Objective:    Blood pressure 144/68, pulse 70, weight 97 5 kg (215 lb)  Physical Exam   Constitutional: He appears well-developed  HENT:   Head: Normocephalic  Eyes: EOM are normal  Pupils are equal, round, and reactive to light  Neck: Normal range of motion  Cardiovascular: Normal rate  Pulmonary/Chest: Effort normal    Musculoskeletal: Normal range of motion  Neurological: He has normal strength and normal reflexes  Gait and coordination normal    Skin: Skin is warm  Psychiatric: He has a normal mood and affect  His speech is normal    Nursing note and vitals reviewed  Neurological Exam    Mental Status  The patient is alert and disoriented to person, place and time  His speech is normal  His language is fluent with no aphasia  He has normal attention span and concentration  Cranial Nerves    CN II: The patient's visual acuity and visual fields are normal   CN III, IV, VI: The patient's pupils are equally round and reactive to light and ocular movements are normal   CN V: The patient has normal facial sensation  CN VII:  The patient has symmetric facial movement  CN VIII:  The patient's hearing is normal   CN IX, X: The patient has symmetric palate movement and normal gag reflex  CN XI: The patient's shoulder shrug strength is normal   CN XII: The patient's tongue is midline without atrophy or fasciculations  Motor  The patient has normal muscle bulk throughout  His overall muscle tone is normal throughout   His strength is 5/5 throughout all four extremities  Sensory  The patient's sensation is normal in all four extremities  Reflexes  Deep tendon reflexes are 2+ and symmetric in all four extremities with downgoing toes bilaterally  Gait and Coordination  The patient has normal gait and station and normal casual, toe, heel, and tandem gait  He has normal coordination bilaterally  ROS:    Review of Systems   Constitutional: Negative for activity change and appetite change  HENT: Negative  Eyes: Negative  Respiratory: Negative  Cardiovascular: Negative  Gastrointestinal: Negative  Endocrine: Negative  Genitourinary: Negative  Musculoskeletal: Negative  Skin: Negative  Allergic/Immunologic: Negative  Neurological: Negative  Hematological: Negative  Psychiatric/Behavioral: Negative  night sweats, flu symptoms, weight loss;  · feeling very tired;  · dry cough, sore throat; or  · warmth, pain, or redness of your skin. Call your doctor at once if you have any of these other serious side effects:  · trouble breathing;  · stabbing chest pain, wheezing, cough with yellow or green mucus;  · pain or burning when you urinate; or  · signs of skin infection such as itching, swelling, warmth, redness, or oozing. Common side effects may include:  · fever;  · nausea, diarrhea, stomach pain;  · headache; or  · cold symptoms such as stuffy nose, sneezing, sore throat, cough. This is not a complete list of side effects and others may occur. Call your doctor for medical advice about side effects. You may report side effects to FDA at 1-290-FDA-6179. What other drugs will affect abatacept? Tell your doctor about all your other medicines, especially:  · adalimumab;  · anakinra;  · certolizumab;  · etanercept;  · golimumab;  · infliximab;  · rituximab; or  · tocilizumab. This list is not complete. Other drugs may affect abatacept, including prescription and over-the-counter medicines, vitamins, and herbal products. Not all possible drug interactions are listed here. Where can I get more information? Your doctor or pharmacist can provide more information about abatacept. Remember, keep this and all other medicines out of the reach of children, never share your medicines with others, and use this medication only for the indication prescribed. Every effort has been made to ensure that the information provided by [address] is accurate, up-to-date, and complete, but no guarantee is made to that effect. Drug information contained herein may be time sensitive.  [employer] information has been compiled for use by healthcare practitioners and consumers in the [place] and therefore [employer] does not warrant that uses outside of the [place] are appropriate, unless specifically indicated otherwise. [facility] drug information does not endorse drugs, diagnose patients or recommend therapy. [facility] drug information is an informational resource designed to assist licensed healthcare practitioners in caring for their patients and/or to serve consumers viewing this service as a supplement to, and not a substitute for, the expertise, skill, knowledge and judgment of healthcare practitioners. The absence of a warning for a given drug or drug combination in no way should be construed to indicate that the drug or drug combination is safe, effective or appropriate for any given patient. [facility] does not assume any responsibility for any aspect of healthcare administered with the aid of information [facility] provides. The information contained herein is not intended to cover all possible uses, directions, precautions, warnings, drug interactions, allergic reactions, or adverse effects. If you have questions about the drugs you are taking, check with your doctor, nurse or pharmacist.  Copyright 9791-1230 [address]. Version: 9.01. Revision date: 11/2/2020. Care instructions adapted under license by [facility] ([facility]). If you have questions about a medical condition or this instruction, always ask your healthcare professional. [place] [address] any warranty or liability for your use of this information.

## 2023-05-30 ENCOUNTER — OFFICE VISIT (OUTPATIENT)
Dept: URGENT CARE | Facility: CLINIC | Age: 43
End: 2023-05-30

## 2023-05-30 VITALS
OXYGEN SATURATION: 98 % | TEMPERATURE: 96.3 F | SYSTOLIC BLOOD PRESSURE: 135 MMHG | RESPIRATION RATE: 16 BRPM | HEART RATE: 77 BPM | DIASTOLIC BLOOD PRESSURE: 69 MMHG

## 2023-05-30 DIAGNOSIS — H61.21 IMPACTED CERUMEN OF RIGHT EAR: Primary | ICD-10-CM

## 2023-05-30 NOTE — PROGRESS NOTES
[facility]        NAME: Yeyo Carvalho is a 37 y o  male  : 1980    MRN: [id number]  DATE: May 30, 2023  TIME: 5:21 PM    Assessment and Plan   Impacted cerumen of right ear [H61 21]  1  Impacted cerumen of right ear          - Disposable curet and lavage with warm H20/hydrogen peroxide cerumen removal     Patient Instructions   - Recommend debrox drops or hydrogen peroxide rinse    Follow up with PCP in 3-5 days  Proceed to  ER if symptoms worsen  Chief Complaint     Chief Complaint   Patient presents with   • Earache     Pt reports got water in right ear and it's been hurting for 10 days  History of Present Illness       36 y/o M presents for R ear pain x 10 days  Pt admits he got water in the ear at that time  Denies pain, fevers, chills  Review of Systems   Review of Systems   Constitutional: Negative for chills and fever  HENT: Negative for congestion, ear discharge, ear pain, postnasal drip, rhinorrhea, sinus pressure and sinus pain  Respiratory: Negative for cough            Current Medications       Current Outpatient Medications:   •  Ascorbic Acid, Vitamin C, (VITAMIN C) 100 MG tablet, Take 100 mg by mouth daily, Disp: , Rfl:   •  Cholecalciferol (VITAMIN D3) 91035 units CAPS, ONE A WEEK FOR 6 SIX WEEKS (Patient not taking: Reported on 2020), Disp: 6 capsule, Rfl: 0  •  ibuprofen (MOTRIN) 600 mg tablet, Take by mouth every 6 (six) hours as needed for mild pain (Patient not taking: Reported on 2023), Disp: , Rfl:   •  meclizine (ANTIVERT) 25 mg tablet, Take 1 tablet (25 mg total) by mouth 3 (three) times a day as needed for dizziness (Patient not taking: Reported on 2020), Disp: 30 tablet, Rfl: 0  •  meclizine (ANTIVERT) 25 mg tablet, Take 1 tablet (25 mg total) by mouth every 8 (eight) hours as needed for dizziness or nausea (Patient not taking: Reported on 2023), Disp: 30 tablet, Rfl: 0    Current Allergies     Allergies as of 2023   • (No Known Allergies)            The following portions of the patient's history were reviewed and updated as appropriate: allergies, current medications, past family history, past medical history, past social history, past surgical history and problem list      Past Medical History:   Diagnosis Date   • Migraine    • UPJ (ureteropelvic junction) obstruction    • UPJ (ureteropelvic junction) obstruction    • Urinary retention        Past Surgical History:   Procedure Laterality Date   • BACK SURGERY     • URETERAL EXPLORATION         No family history on file  Medications have been verified  Objective   /69   Pulse 77   Temp (!) 96 3 °F (35 7 °C)   Resp 16   SpO2 98%   No LMP for male patient  Physical Exam     Physical Exam  Vitals and nursing note reviewed  Constitutional:       General: He is not in acute distress  Appearance: He is not toxic-appearing  HENT:      Head: Normocephalic  Right Ear: There is impacted cerumen  Left Ear: Tympanic membrane, ear canal and external ear normal       Ears:      Comments: R ear cerumen impaction     Nose: Nose normal       Mouth/Throat:      Mouth: Mucous membranes are moist       Pharynx: No oropharyngeal exudate or posterior oropharyngeal erythema  Eyes:      General:         Left eye: No discharge  Conjunctiva/sclera: Conjunctivae normal    Pulmonary:      Effort: Pulmonary effort is normal    Neurological:      Mental Status: He is alert     Psychiatric:         Mood and Affect: Mood normal          Behavior: Behavior normal

## 2023-07-09 ENCOUNTER — OFFICE VISIT (OUTPATIENT)
Dept: URGENT CARE | Facility: MEDICAL CENTER | Age: 43
End: 2023-07-09
Payer: COMMERCIAL

## 2023-07-09 VITALS
RESPIRATION RATE: 18 BRPM | DIASTOLIC BLOOD PRESSURE: 74 MMHG | TEMPERATURE: 97.3 F | HEART RATE: 72 BPM | OXYGEN SATURATION: 98 % | SYSTOLIC BLOOD PRESSURE: 126 MMHG
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
## 2023-07-09 DIAGNOSIS — S86.812A STRAIN OF CALF MUSCLE, LEFT, INITIAL ENCOUNTER: Primary | ICD-10-CM

## 2023-07-09 PROCEDURE — 99213 OFFICE O/P EST LOW 20 MIN: CPT | Performed by: PHYSICIAN ASSISTANT

## 2023-07-09 NOTE — PROGRESS NOTES
[facility]        NAME: Dayana Xie is a 37 y.o. male  : 1980    MRN: [id number]  DATE: 2023  TIME: 6:14 PM    Assessment and Plan   Strain of calf muscle, left, initial encounter [S86.812A]  1. Strain of calf muscle, left, initial encounter  Ambulatory Referral to Physical Therapy    Ambulatory Referral to Orthopedic Surgery      2. Injury while playing baseball  Ambulatory Referral to Physical Therapy    Ambulatory Referral to Orthopedic Surgery            Patient Instructions     Patient has strain of the left calf. I referred him for physical therapy and recommended ice/heat, stretching, NSAIDs, rest.  Referred him to orthopedics as well for any persistent or worsening condition despite conservative measures. Follow up with PCP in 3-5 days. Proceed to  ER if symptoms worsen. Chief Complaint     Chief Complaint   Patient presents with   • right calf pain     Was running in baseball practice and started having right calf pain. Difficulty walking due to pain. States it feels like a really bad cramp and rates as a 5/10 with resting. Applied ice after incident. History of Present Illness       Patient presents after suffering injury to his left calf. He was playing baseball and was running when he felt immediate pain and cramping. Pain worse with walking and weightbearing. Rates the pain as a 5 out of 10 in intensity at rest.  He has applied ice but not taken anything orally for pain. Review of Systems   Review of Systems   Constitutional: Negative. Respiratory: Negative. Cardiovascular: Negative. Gastrointestinal: Negative. Genitourinary: Negative. Musculoskeletal:        Left calf pain status post injury   Neurological: Negative.           Current Medications       Current Outpatient Medications:   •  Ascorbic Acid, Vitamin C, (VITAMIN C) 100 MG tablet, Take 100 mg by mouth daily, Disp: , Rfl:   •  Cholecalciferol (VITAMIN D3) 43484 units CAPS, ONE A WEEK FOR 6 SIX WEEKS (Patient not taking: Reported on 6/4/2020), Disp: 6 capsule, Rfl: 0  •  ibuprofen (MOTRIN) 600 mg tablet, Take by mouth every 6 (six) hours as needed for mild pain (Patient not taking: Reported on 5/30/2023), Disp: , Rfl:   •  meclizine (ANTIVERT) 25 mg tablet, Take 1 tablet (25 mg total) by mouth 3 (three) times a day as needed for dizziness (Patient not taking: Reported on 6/4/2020), Disp: 30 tablet, Rfl: 0  •  meclizine (ANTIVERT) 25 mg tablet, Take 1 tablet (25 mg total) by mouth every 8 (eight) hours as needed for dizziness or nausea (Patient not taking: Reported on 5/30/2023), Disp: 30 tablet, Rfl: 0    Current Allergies     Allergies as of 07/09/2023   • (No Known Allergies)            The following portions of the patient's history were reviewed and updated as appropriate: allergies, current medications, past family history, past medical history, past social history, past surgical history and problem list.     Past Medical History:   Diagnosis Date   • Migraine    • UPJ (ureteropelvic junction) obstruction    • UPJ (ureteropelvic junction) obstruction    • Urinary retention        Past Surgical History:   Procedure Laterality Date   • BACK SURGERY     • URETERAL EXPLORATION         History reviewed. No pertinent family history. Medications have been verified. Objective   /74 (BP Location: Left arm, Patient Position: Sitting)   Pulse 72   Temp (!) 97.3 °F (36.3 °C)   Resp 18   SpO2 98%   No LMP for male patient. Physical Exam     Physical Exam  Vitals reviewed. Constitutional:       General: He is not in acute distress. Appearance: He is well-developed. Musculoskeletal:      Left lower leg: No edema. Comments: Left calf with tenderness to palpation with faint localized ecchymosis. No significant swelling, erythema, or pitting edema pretibially. Hylton's test is negative. Pain worse with passive dorsiflexion of the left foot. Neurological:      Mental Status: He is alert and oriented to person, place, and time. Sensory: No sensory deficit.

## 2023-07-12 ENCOUNTER — EVALUATION (OUTPATIENT)
Dept: PHYSICAL THERAPY | Facility: REHABILITATION | Age: 43
End: 2023-07-12
Payer: COMMERCIAL

## 2023-07-12 DIAGNOSIS — S86.811D STRAIN OF CALF MUSCLE, RIGHT, SUBSEQUENT ENCOUNTER: Primary | ICD-10-CM

## 2023-07-12 DIAGNOSIS — S76.312D STRAIN OF LEFT HAMSTRING MUSCLE, SUBSEQUENT ENCOUNTER: ICD-10-CM

## 2023-07-12 PROCEDURE — 97161 PT EVAL LOW COMPLEX 20 MIN: CPT | Performed by: PHYSICAL THERAPIST

## 2023-07-12 PROCEDURE — 97112 NEUROMUSCULAR REEDUCATION: CPT | Performed by: PHYSICAL THERAPIST

## 2023-07-12 NOTE — PROGRESS NOTES
PT Evaluation     Today's date: 2023  Patient name: Shruthi Alfonso  : 1980  MRN: [id number]  Referring provider: Fabi Madera PA-C  Dx:   Encounter Diagnosis     ICD-10-CM    1. Strain of calf muscle, right, subsequent encounter  S86.811D       2. Strain of left hamstring muscle, subsequent encounter  S76.312D           Start Time: 1615  Stop Time: 1710  Total time in clinic (min): 55 minutes    Assessment  Assessment details: Patient is a 37 y.o. male presenting to initial examination with chief complaint of R calf pain. Signs and symptoms are consistent with R medial gastrocnemius strain and L hamstring strain. Primary impairments include R ankle DF ROM deficits, R gastrocnemius flexibility deficits, and B/L hamstring flexibility deficits. As a result of impairments patient experiences limitations with functional/daily activities including DF, running, calf stretching, stair navigation, and inability to participate in baseball. Educated patient regarding plan of care and answered all patient questions to patient satisfaction. Patient would benefit from skilled PT interventions to address above impairments, achieve goals, and to maximize function.  Thank you for the referral.    Impairments: abnormal muscle firing, abnormal or restricted ROM, abnormal movement, activity intolerance, impaired physical strength and pain with function     Prognosis: good    Goals  Impairment Goals: 4-6 weeks  - Patient to decrease pain to 0/10  - Patient to improve R ankle DF AROM to 10 degrees    Functional Goals: by discharge  - Patient to discharge to independent [facility]  - Patient to return to prior level of function  - Patient to tolerate stair navigation without increased pain or difficulty  - Patient to tolerate return to recreational sports without increased pain or difficulty  - Patient to tolerate running without increased pain or difficulty      Plan  Patient would benefit from: skilled physical therapy  Planned modality interventions: cryotherapy, TENS and thermotherapy: hydrocollator packs  Planned therapy interventions: flexibility, home exercise program, joint mobilization, manual therapy, neuromuscular re-education, patient education, strengthening, stretching, therapeutic activities, therapeutic exercise and functional ROM exercises  Frequency: 1x week  Duration in weeks: 8  Treatment plan discussed with: patient        Subjective Evaluation    History of Present Illness  Mechanism of injury: HISTORY OF PRESENT ILLNESS: Patient plays in a  baseball league and he has been dealing with some L hamstring irritation. He tweaked his R calf in a game 1.5 weeks ago and this week had significant increase in pain with running out of the batter's box. Symptoms have been slightly improving since the flare. No bruising noted however patient has observed some swelling. He went to urgent care 23 with no further treatment. No low back pain, sensory changes, or radicular pain.   PRIOR TREATMENT: none  AGGRAVATING FACTORS: DF, running, calf stretching, stair navigation  EASING FACTORS: change of position  WORK:  (walking)  IMAGING: none  FUNCTIONAL LIMITATIONS: DF, running, calf stretching, stair navigation, and inability to participate in baseball  SUBJECTIVE FUNCTIONAL LEVEL: 80%  PATIENT GOAL: to get back to 100%  Pain  Current pain ratin  At best pain ratin  At worst pain ratin  Location: R calf  Quality: cramping          Objective     Palpation     Additional Palpation Details  TTP R medial head of gastrocnemius      Neurological Testing     Sensation     Lumbar   Left   Intact: light touch    Right   Intact: light touch    Active Range of Motion   Left Ankle/Foot   Dorsiflexion (ke): 15 degrees   Plantar flexion: WFL  Inversion: WFL  Eversion: WFL    Right Ankle/Foot   Dorsiflexion (ke): 8 degrees   Plantar flexion: WFL  Inversion: WFL  Eversion: WFL    Additional Active Range of Motion Details  R gastrocnemius flexibility deficits with familiar symptoms during passive stretch      Strength/Myotome Testing     Left Hip   Planes of Motion   Flexion: 4+    Right Hip   Planes of Motion   Flexion: 4+    Left Knee   Flexion: 5  Prone flexion: 5  Extension: 5    Right Knee   Flexion: 5  Prone flexion: 5  Extension: 5    Left Ankle/Foot   Dorsiflexion: 5  Plantar flexion: 5  Inversion: 5  Eversion: 5    Right Ankle/Foot   Dorsiflexion: 5  Inversion: 5  Eversion: 5    Additional Strength Details  Single leg heel rise test:  L: 20  R: 3    Tests     Left Hip   90/90 SLR: Positive. Right Hip   90/90 SLR: Positive. Left Ankle/Foot   Negative for Hylton. Right Ankle/Foot   Negative for Hylton.      Ambulation     Observational Gait     Additional Observational Gait Details  No deviations             Diagnosis: R medial gastrocnemius strain and L hamstring strain   Precautions: none   Primary impairments: R ankle DF ROM deficits, R gastrocnemius flexibility deficits, and B/L hamstring flexibility deficits   *asterisks by exercise = given for HEP    7/12       Manuals        R medial gastrocnemius IASTM        L hamstring IASTM        R medial gastrocnemius laser                        There Ex        Rec bike        L hamstring eccentrics prone        90-90 HS stretch        Ankle circles        Ankle alphabet                                        Neuro Re-Ed        PF isometrics seated with towel *  10" x 10       Towel crunches        3-way ankle band (no PF)        SLS                                                                        Re-evaluation             Ther Act/Gait                                         Modalities

## 2023-07-17 ENCOUNTER — APPOINTMENT (OUTPATIENT)
Dept: PHYSICAL THERAPY | Facility: REHABILITATION | Age: 43
End: 2023-07-17
Payer: COMMERCIAL

## 2023-07-19 ENCOUNTER — APPOINTMENT (OUTPATIENT)
Dept: PHYSICAL THERAPY | Facility: REHABILITATION | Age: 43
End: 2023-07-19
Payer: COMMERCIAL

## 2023-07-20 ENCOUNTER — APPOINTMENT (OUTPATIENT)
Dept: PHYSICAL THERAPY | Facility: REHABILITATION | Age: 43
End: 2023-07-20
Payer: COMMERCIAL

## 2023-07-27 ENCOUNTER — OFFICE VISIT (OUTPATIENT)
Dept: PHYSICAL THERAPY | Facility: REHABILITATION | Age: 43
End: 2023-07-27
Payer: COMMERCIAL

## 2023-07-27 DIAGNOSIS — S76.312D STRAIN OF LEFT HAMSTRING MUSCLE, SUBSEQUENT ENCOUNTER: ICD-10-CM

## 2023-07-27 DIAGNOSIS — S86.811D STRAIN OF CALF MUSCLE, RIGHT, SUBSEQUENT ENCOUNTER: Primary | ICD-10-CM

## 2023-07-27 PROCEDURE — 97140 MANUAL THERAPY 1/> REGIONS: CPT | Performed by: PHYSICAL THERAPIST

## 2023-07-27 PROCEDURE — 97110 THERAPEUTIC EXERCISES: CPT | Performed by: PHYSICAL THERAPIST

## 2023-07-27 NOTE — PROGRESS NOTES
Daily Note     Today's date: 2023  Patient name: Homero Saleem  : 1980  MRN: [id number]  Referring provider: Dayami Bright PA-C  Dx:   Encounter Diagnosis     ICD-10-CM    1. Strain of calf muscle, right, subsequent encounter  S86.811D       2. Strain of left hamstring muscle, subsequent encounter  S76.312D           Start Time: 1535  Stop Time: 1615  Total time in clinic (min): 40 minutes    Subjective: Patient reports he was doing very well until Tuesday when he flared his calf up running out to his truck to put the windows up in the rain. He denies any issues with HEP      Objective: See treatment diary below      Assessment: Tolerated treatment well. Increased tone noted R medial gastrocnemius during IASTM. Deferred gastrocnemius stretching/strengthening due to recent symptom flare however consider adding next visit if appropriate based on presentation. Patient demonstrated fatigue post treatment, exhibited good technique with therapeutic exercises and would benefit from continued PT      Plan: Continue per plan of care.       Diagnosis: R medial gastrocnemius strain and L hamstring strain   Precautions: none   Primary impairments: R ankle DF ROM deficits, R gastrocnemius flexibility deficits, and B/L hamstring flexibility deficits   *asterisks by exercise = given for HEP          Manuals        R medial gastrocnemius IASTM   15'      L hamstring IASTM   5'      R medial gastrocnemius laser   5'                      There Ex        Upright bike   L2 x 5'      L hamstring eccentrics prone        90-90 HS stretch        Ankle circles        Ankle alphabet *   x 1                                      Neuro Re-Ed        PF isometrics seated with towel *  10" x 10       Towel crunches        3-way ankle band (no PF)   blue x 20 ea      SLS                                                                        Re-evaluation             Ther Act/Gait Modalities

## 2023-08-03 ENCOUNTER — OFFICE VISIT (OUTPATIENT)
Dept: PHYSICAL THERAPY | Facility: REHABILITATION | Age: 43
End: 2023-08-03
Payer: COMMERCIAL

## 2023-08-03 DIAGNOSIS — S76.312D STRAIN OF LEFT HAMSTRING MUSCLE, SUBSEQUENT ENCOUNTER: ICD-10-CM

## 2023-08-03 DIAGNOSIS — S86.811D STRAIN OF CALF MUSCLE, RIGHT, SUBSEQUENT ENCOUNTER: Primary | ICD-10-CM

## 2023-08-03 PROCEDURE — 97140 MANUAL THERAPY 1/> REGIONS: CPT | Performed by: PHYSICAL THERAPIST

## 2023-08-03 PROCEDURE — 97110 THERAPEUTIC EXERCISES: CPT | Performed by: PHYSICAL THERAPIST

## 2023-08-03 PROCEDURE — 97112 NEUROMUSCULAR REEDUCATION: CPT | Performed by: PHYSICAL THERAPIST

## 2023-08-03 NOTE — PROGRESS NOTES
Daily Note     Today's date: 8/3/2023  Patient name: Andrea Morrow  : 1980  MRN: [id number]  Referring provider: Fady Womack PA-C  Dx:   Encounter Diagnosis     ICD-10-CM    1. Strain of calf muscle, right, subsequent encounter  S86.811D       2. Strain of left hamstring muscle, subsequent encounter  S76.312D           Start Time: 1535  Stop Time: 1620  Total time in clinic (min): 45 minutes    Subjective: Patient reports he is moving in the right direction. He was able to golf with minimal soreness but has not done any higher speed movements      Objective: See treatment diary below      Assessment: Initiated gentle gastrocnemius stretch in standing and updated HEP to include this exercise. Progressed to single leg stance on firm floor and foam with no symptom aggravation. Increased tone/scar tissue appreciated medial gastrocnemius during IASTM. Instructed patient in IASTM at home using handle of butter knife. Patient demonstrated fatigue post treatment, exhibited good technique with therapeutic exercises and would benefit from continued PT      Plan: Continue per plan of care.       Diagnosis: R medial gastrocnemius strain and L hamstring strain   Precautions: none   Primary impairments: R ankle DF ROM deficits, R gastrocnemius flexibility deficits, and B/L hamstring flexibility deficits   *asterisks by exercise = given for HEP    7/12 7/27 8/3     Manuals        R medial gastrocnemius IASTM   15'  15'     L hamstring IASTM   5'      R medial gastrocnemius laser   5'                      There Ex        Upright bike   L2 x 5'  L2 x 6'     L hamstring eccentrics prone        90-90 HS stretch        Ankle circles        Ankle alphabet *   x 1      Standing gastroc stretch *    30" x 3                             Neuro Re-Ed        PF isometrics seated with towel *  10" x 10       Towel crunches        3-way ankle band (no PF)   blue x 20 ea      SLS     20" x 3     SLS foam *    20" x 3     Standing PF eccentrics    x 20                                                             Re-evaluation             Ther Act/Gait                                         Modalities

## 2023-08-10 ENCOUNTER — OFFICE VISIT (OUTPATIENT)
Dept: PHYSICAL THERAPY | Facility: REHABILITATION | Age: 43
End: 2023-08-10
Payer: COMMERCIAL

## 2023-08-10 DIAGNOSIS — S76.312D STRAIN OF LEFT HAMSTRING MUSCLE, SUBSEQUENT ENCOUNTER: ICD-10-CM

## 2023-08-10 DIAGNOSIS — S86.811D STRAIN OF CALF MUSCLE, RIGHT, SUBSEQUENT ENCOUNTER: Primary | ICD-10-CM

## 2023-08-10 PROCEDURE — 97112 NEUROMUSCULAR REEDUCATION: CPT | Performed by: PHYSICAL THERAPIST

## 2023-08-10 PROCEDURE — 97140 MANUAL THERAPY 1/> REGIONS: CPT | Performed by: PHYSICAL THERAPIST

## 2023-08-10 PROCEDURE — 97110 THERAPEUTIC EXERCISES: CPT | Performed by: PHYSICAL THERAPIST

## 2023-08-10 NOTE — PROGRESS NOTES
Daily Note     Today's date: 8/10/2023  Patient name: Luis Yee  : 1980  MRN: [id number]  Referring provider: Dayan Jensen PA-C  Dx:   Encounter Diagnosis     ICD-10-CM    1. Strain of calf muscle, right, subsequent encounter  S86.811D       2. Strain of left hamstring muscle, subsequent encounter  S76.312D           Start Time: 1530  Stop Time: 1615  Total time in clinic (min): 45 minutes    Subjective: Patient reports he has been progressing well since last visit. He is doing okay going up and down the stairs quicker      Objective: See treatment diary below      Assessment: Tolerated treatment well with positive response to gastrocnemius IASTM. Educated patient regarding signs of DVT although at present time very low suspicion based on mechanical nature of symptoms. No palpable tenderness, redness, unilateral swelling, pitting edema, or collateral superficial veins. Improved recruitment of lateral head of gastrocnemius observed during strengthening with less activity medial head. Patient demonstrated fatigue post treatment, exhibited good technique with therapeutic exercises and would benefit from continued PT      Plan: Continue per plan of care.       Diagnosis: R medial gastrocnemius strain and L hamstring strain   Precautions: none   Primary impairments: R ankle DF ROM deficits, R gastrocnemius flexibility deficits, and B/L hamstring flexibility deficits   *asterisks by exercise = given for HEP    7/12 7/27 8/3 8/10    Manuals        R medial gastrocnemius IASTM   15'  15'  15'    L hamstring IASTM   5'      R medial gastrocnemius laser   5'                      There Ex        Upright bike   L2 x 5'  L2 x 6'  L4 x 6'    L hamstring eccentrics prone        90-90 HS stretch        Ankle circles        Ankle alphabet *   x 1      Standing gastroc stretch *    30" x 3  reviewed form                            Neuro Re-Ed        PF isometrics seated with towel *  10" x 10       Towel crunches 3-way ankle band (no PF)   blue x 20 ea      SLS     20" x 3     SLS foam *    20" x 3     Standing PF eccentrics *    x 20  3 x 10    Heel raises staggered stance     2 x 10    Leg press U/L heel raises     55 lbs x 15 ea                                            Re-evaluation             Ther Act/Gait                                         Modalities

## 2023-08-24 ENCOUNTER — EVALUATION (OUTPATIENT)
Dept: PHYSICAL THERAPY | Facility: REHABILITATION | Age: 43
End: 2023-08-24
Payer: COMMERCIAL

## 2023-08-24 DIAGNOSIS — S76.312D STRAIN OF LEFT HAMSTRING MUSCLE, SUBSEQUENT ENCOUNTER: ICD-10-CM

## 2023-08-24 DIAGNOSIS — S86.811D STRAIN OF CALF MUSCLE, RIGHT, SUBSEQUENT ENCOUNTER: Primary | ICD-10-CM

## 2023-08-24 PROCEDURE — 97112 NEUROMUSCULAR REEDUCATION: CPT | Performed by: PHYSICAL THERAPIST

## 2023-08-24 NOTE — PROGRESS NOTES
PT Re-Evaluation     Today's date: 2023  Patient name: Germaine Cannon  : 1980  MRN: [id number]  Referring provider: Noel Whitfield PA-C  Dx:   Encounter Diagnosis     ICD-10-CM    1. Strain of calf muscle, right, subsequent encounter  S86.811D       2. Strain of left hamstring muscle, subsequent encounter  S76.312D           Start Time: 1530  Stop Time: 1615  Total time in clinic (min): 45 minutes    Assessment  Assessment details: Patient is a 37 y.o. male presenting to reexamination with chief complaint of R calf pain. Patient exhibits good progress toward objective and functional goals at time of reexamination. Patient now able to complete 20 single leg heel raises involved side compared to 3 at initial examination. Patient exhibits improvements with stair navigation, DF, and tolerance to standing/walking since initiating PT however continues to have limitations compared to prior level of function. Remaining functional limitations include running, calf stretching and inability to participate in baseball. As a result of impairments patient has continued limitations with daily and functional activities and would benefit from continued skilled PT interventions to address these impairments in order to maximize function.        Impairments: abnormal muscle firing, abnormal or restricted ROM, abnormal movement, activity intolerance, impaired physical strength and pain with function     Prognosis: good    Goals  Impairment Goals: 4-6 weeks  - Patient to decrease pain to 0/10 - MET  - Patient to improve R ankle DF AROM to 10 degrees - MET    Functional Goals: by discharge  - Patient to discharge to [facility] - PROGRESSING  - Patient to return to prior level of function - PROGRESSING  - Patient to tolerate stair navigation without increased pain or difficulty - MET  - Patient to tolerate return to recreational sports without increased pain or difficulty - PROGRESSING  - Patient to tolerate running without increased pain or difficulty - PROGRESSING      Plan  Patient would benefit from: skilled physical therapy  Planned modality interventions: cryotherapy, TENS and thermotherapy: hydrocollator packs  Planned therapy interventions: flexibility, home exercise program, joint mobilization, manual therapy, neuromuscular re-education, patient education, strengthening, stretching, therapeutic activities, therapeutic exercise and functional ROM exercises  Frequency: 1x week  Duration in weeks: 6  Treatment plan discussed with: patient        Subjective Evaluation    History of Present Illness  Mechanism of injury: HISTORY OF PRESENT ILLNESS: Patient reports he is improving with interventions. He notes increased strength and mobility since beginning PT. He has returned to light jogging with modifications and swimming. He has not yet returned to sports.   PRIOR TREATMENT: none  AGGRAVATING FACTORS: running, calf stretching  EASING FACTORS: change of position  WORK:  (walking)  IMAGING: none  FUNCTIONAL LIMITATIONS: running, calf stretching and inability to participate in baseball  IMPROVEMENTS: stair navigation, DF, and tolerance to standing/walking  SUBJECTIVE FUNCTIONAL LEVEL: 90%  PATIENT GOAL: to get back to 100%  Pain  Current pain ratin  At best pain ratin  At worst pain ratin  Location: R calf  Quality: cramping          Objective     Palpation     Additional Palpation Details  TTP R medial head of gastrocnemius      Neurological Testing     Sensation     Lumbar   Left   Intact: light touch    Right   Intact: light touch    Active Range of Motion   Left Ankle/Foot   Dorsiflexion (ke): 15 degrees   Plantar flexion: WFL  Inversion: WFL  Eversion: WFL    Right Ankle/Foot   Dorsiflexion (ke): 10 degrees   Plantar flexion: WFL  Inversion: WFL  Eversion: WFL    Additional Active Range of Motion Details  R gastrocnemius flexibility deficits      Strength/Myotome Testing     Left Hip   Planes of Motion   Flexion: 5    Right Hip   Planes of Motion   Flexion: 5    Left Knee   Flexion: 5  Prone flexion: 5  Extension: 5    Right Knee   Flexion: 5  Prone flexion: 5  Extension: 5    Left Ankle/Foot   Dorsiflexion: 5  Plantar flexion: 5  Inversion: 5  Eversion: 5    Right Ankle/Foot   Dorsiflexion: 5  Plantar flexion: 5  Inversion: 5  Eversion: 5    Additional Strength Details  Single leg heel rise test:  L: 20  R: 20    Tests     Left Hip   90/90 SLR: Positive. Right Hip   90/90 SLR: Positive. Left Ankle/Foot   Negative for Hylton. Right Ankle/Foot   Negative for Hylton.      Ambulation     Observational Gait     Additional Observational Gait Details  No deviations             Diagnosis: R medial gastrocnemius strain and L hamstring strain   Precautions: none   Primary impairments: R ankle DF ROM deficits, R gastrocnemius flexibility deficits, and B/L hamstring flexibility deficits   *asterisks by exercise = given for HEP    7/12 7/27 8/3 8/10 8/24   Manuals        R medial gastrocnemius IASTM   15'  15'  15'    L hamstring IASTM   5'      R medial gastrocnemius laser   5'                      There Ex        Upright bike   L2 x 5'  L2 x 6'  L4 x 6'  L3 x 6'   L hamstring eccentrics prone        90-90 HS stretch        Ankle circles        Ankle alphabet *   x 1      Standing gastroc stretch *    30" x 3  reviewed form    Step gastroc stretch *      HEP                   Neuro Re-Ed        PF isometrics seated with towel *  10" x 10       Towel crunches        3-way ankle band (no PF)   blue x 20 ea      SLS     20" x 3     SLS foam *    20" x 3     Standing PF eccentrics *    x 20  3 x 10    Heel raises staggered stance     2 x 10    Leg press U/L heel raises     55 lbs x 15 ea                                            Re-evaluation          CM   Ther Act/Gait                                         Modalities

## 2023-08-31 ENCOUNTER — APPOINTMENT (OUTPATIENT)
Dept: PHYSICAL THERAPY | Facility: REHABILITATION | Age: 43
End: 2023-08-31
Payer: COMMERCIAL